# Patient Record
Sex: FEMALE | Race: WHITE | Employment: OTHER | URBAN - METROPOLITAN AREA
[De-identification: names, ages, dates, MRNs, and addresses within clinical notes are randomized per-mention and may not be internally consistent; named-entity substitution may affect disease eponyms.]

---

## 2019-05-23 ENCOUNTER — HOSPITAL ENCOUNTER (OUTPATIENT)
Dept: PREADMISSION TESTING | Age: 79
Discharge: HOME | End: 2019-05-23
Payer: MEDICARE

## 2019-05-23 VITALS
HEIGHT: 70 IN | SYSTOLIC BLOOD PRESSURE: 121 MMHG | HEART RATE: 69 BPM | BODY MASS INDEX: 18.67 KG/M2 | DIASTOLIC BLOOD PRESSURE: 74 MMHG | RESPIRATION RATE: 20 BRPM | WEIGHT: 130.38 LBS | TEMPERATURE: 98 F

## 2019-05-23 PROBLEM — M16.11 PRIMARY LOCALIZED OSTEOARTHRITIS OF RIGHT HIP: Status: ACTIVE | Noted: 2019-05-23

## 2019-05-23 LAB
ABO + RH BLD: NORMAL
ANION GAP SERPL CALC-SCNC: 3 MMOL/L (ref 5–15)
APPEARANCE UR: CLEAR
ATRIAL RATE: 65 BPM
BACTERIA URNS QL MICRO: NEGATIVE /HPF
BILIRUB UR QL: NEGATIVE
BLOOD GROUP ANTIBODIES SERPL: NORMAL
BUN SERPL-MCNC: 21 MG/DL (ref 6–20)
BUN/CREAT SERPL: 33 (ref 12–20)
CALCIUM SERPL-MCNC: 8.5 MG/DL (ref 8.5–10.1)
CALCULATED P AXIS, ECG09: 62 DEGREES
CALCULATED R AXIS, ECG10: 33 DEGREES
CALCULATED T AXIS, ECG11: 38 DEGREES
CHLORIDE SERPL-SCNC: 108 MMOL/L (ref 97–108)
CO2 SERPL-SCNC: 29 MMOL/L (ref 21–32)
COLOR UR: NORMAL
CREAT SERPL-MCNC: 0.63 MG/DL (ref 0.55–1.02)
DIAGNOSIS, 93000: NORMAL
EPITH CASTS URNS QL MICRO: NORMAL /LPF
ERYTHROCYTE [DISTWIDTH] IN BLOOD BY AUTOMATED COUNT: 13.6 % (ref 11.5–14.5)
EST. AVERAGE GLUCOSE BLD GHB EST-MCNC: 111 MG/DL
GLUCOSE SERPL-MCNC: 88 MG/DL (ref 65–100)
GLUCOSE UR STRIP.AUTO-MCNC: NEGATIVE MG/DL
HBA1C MFR BLD: 5.5 % (ref 4.2–6.3)
HCT VFR BLD AUTO: 42.6 % (ref 35–47)
HGB BLD-MCNC: 12.9 G/DL (ref 11.5–16)
HGB UR QL STRIP: NEGATIVE
HYALINE CASTS URNS QL MICRO: NORMAL /LPF (ref 0–5)
INR PPP: 1 (ref 0.9–1.1)
KETONES UR QL STRIP.AUTO: NEGATIVE MG/DL
LEUKOCYTE ESTERASE UR QL STRIP.AUTO: NEGATIVE
MCH RBC QN AUTO: 29.3 PG (ref 26–34)
MCHC RBC AUTO-ENTMCNC: 30.3 G/DL (ref 30–36.5)
MCV RBC AUTO: 96.8 FL (ref 80–99)
NITRITE UR QL STRIP.AUTO: NEGATIVE
NRBC # BLD: 0 K/UL (ref 0–0.01)
NRBC BLD-RTO: 0 PER 100 WBC
P-R INTERVAL, ECG05: 184 MS
PH UR STRIP: 5.5 [PH] (ref 5–8)
PLATELET # BLD AUTO: 216 K/UL (ref 150–400)
PMV BLD AUTO: 11.1 FL (ref 8.9–12.9)
POTASSIUM SERPL-SCNC: 4.1 MMOL/L (ref 3.5–5.1)
PROT UR STRIP-MCNC: NEGATIVE MG/DL
PROTHROMBIN TIME: 10.3 SEC (ref 9–11.1)
Q-T INTERVAL, ECG07: 412 MS
QRS DURATION, ECG06: 80 MS
QTC CALCULATION (BEZET), ECG08: 428 MS
RBC # BLD AUTO: 4.4 M/UL (ref 3.8–5.2)
RBC #/AREA URNS HPF: NORMAL /HPF (ref 0–5)
SODIUM SERPL-SCNC: 140 MMOL/L (ref 136–145)
SP GR UR REFRACTOMETRY: 1.02 (ref 1–1.03)
SPECIMEN EXP DATE BLD: NORMAL
UA: UC IF INDICATED,UAUC: NORMAL
UROBILINOGEN UR QL STRIP.AUTO: 0.2 EU/DL (ref 0.2–1)
VENTRICULAR RATE, ECG03: 65 BPM
WBC # BLD AUTO: 8.3 K/UL (ref 3.6–11)
WBC URNS QL MICRO: NORMAL /HPF (ref 0–4)

## 2019-05-23 PROCEDURE — 86900 BLOOD TYPING SEROLOGIC ABO: CPT

## 2019-05-23 PROCEDURE — 85027 COMPLETE CBC AUTOMATED: CPT

## 2019-05-23 PROCEDURE — 80048 BASIC METABOLIC PNL TOTAL CA: CPT

## 2019-05-23 PROCEDURE — 81001 URINALYSIS AUTO W/SCOPE: CPT

## 2019-05-23 PROCEDURE — 93005 ELECTROCARDIOGRAM TRACING: CPT

## 2019-05-23 PROCEDURE — 83036 HEMOGLOBIN GLYCOSYLATED A1C: CPT

## 2019-05-23 PROCEDURE — 85610 PROTHROMBIN TIME: CPT

## 2019-05-23 RX ORDER — RANITIDINE HCL 75 MG
TABLET ORAL AS NEEDED
COMMUNITY

## 2019-05-23 RX ORDER — MELATONIN
1000 DAILY
COMMUNITY

## 2019-05-23 NOTE — H&P
Chief Complaint: Follow-up of the Right Hip     Robert Temple comes into the clinic today for follow-up of her right hip. She does have significant arthritis in the right knee, but today she was more concerned about her right hip pain. She complains of a gradual onset of right hip pain that has worsen since her last office visit. She was previously noted to have moderate arthritis about the right hip. She tried intra-articular steroid injection for the right hip which only provided her only a week's worth of symptomatic relief. The patient was sent to obtain an Mri to rule out any acute pathology that would require surgical intervention. She comes in today to review her results. No recent injuries were noted. No systemic symptoms of infection. No radicular symptoms. Review of Systems   5/13/2019     Constitutional: Unexplained: Negative  Genitourinary: Frequent Urination: Negative  HEENT: Vision Loss: Positive  Neurological: Memory Loss: Negative  Integumentary: Rash: Negative  Cardiovascular: Palpatations: Negative  Hematologic: Bruises/Bleeds Easily: Positive  Gastrointestinal: Constipation: Negative  Immunological: Seasonal Allergies: Positive  Musculoskeletal: Joint Pain: Positive     Medical History        Past Medical History:   Diagnosis Date    GERD (gastroesophageal reflux disease)      Osteoarthritis              Surgical History         Past Surgical History:   Procedure Laterality Date    NO RELEVANT ORTHOPAEDIC SURGERIES        NO RELEVANT SURGERIES                Objective:          Vitals:     05/13/19 0926   BP: 130/74         Constitutional:  No acute distress. Well nourished. Well developed. Eyes:  Sclera are nonicteric. Respiratory:  No labored breathing. Cardiovascular:  No marked edema. Skin:  No marked skin ulcers. Neurological:  No marked sensory loss noted. Psychiatric: Alert and oriented x3.   Musculoskeletal      On exam of the right hip reveals she ambulates slowly favoring the right hip. Pain is more anteriorly. Any attempts to rotate the right hip elicits pain. No effusion. No sign of infection. No ecchymosis or erythema. No cellulitis or rash. I detect no obvious motor or sensory deficits in the lower extremities. The neurovascular status is intact.      On exam of the right knee when ambulating elicits mild to moderate right knee pain.         Imaging/Studies:    Order: XR HIP 2+ VW RIGHT - Indication: Right hip pain        X-ray Hip Right 2-3 Views W/pelvis When Performed ([id number])     Result Date: 5/13/2019  AP Pelvis, Frog Lateral.      Impression: Impression: I ordered and interpreted X-rays right hip. They revealed increased joint space narrowing and sclerosis of the femoral right hip. No metastatic disease.      I personally reviewed and interpreted the Mri of the right hip taken on 05/10/19.      Impression:    1. Bone marrow edema within the right femoral head with linear subchondral hypointensity in the superolateral femoral head, which may be related to osteonecrosis versus a subchondral insufficiency fracture.    2. There is a second, somewhat bandlike region of bone marrow edema within the medial intertrochanteric right proximal femur, which is indeterminant. No definite fracture line is identified, and these changes may be reactive, but cannot entirely exclude the possibility of an incomplete fracture  3. Moderate to severe right hip chondrosis with a right hip joint effusion and likely synovitis.    4. Severe lower lumbar degenerative disc disease.    5. Mild tendinopathy of the abductor tendons with mild left greater trochanteric bursitis.       Assessment:   There is no problem list on file for this patient.            ICD-10-CM   1. Avascular necrosis of femoral head, right M87.051   2. Primary osteoarthritis of right hip M16.11   3. Right hip pain M25.551            Plan:   I personally reviewed my findings with the patient.  Today's x-ray show increasing degenerative changes to the hip. The Mri revealed the probability of avascular necrosis or perhaps a stress fracture. I reviewed the pathophysiology of avascular necrosis.      We discussed treatment options. I explained to her that there is a possibility of a stress fracture, but hoped that her symptoms would improve with time. Due to the increase in degenerative changes and its association with AVN, I explained that the only treatment modality that would provide her long-term symptomatic relief would be total hip replacement. She understood this, but at this time she declined this option.      I reviewed the hospitalization, post-op and rehabilitation for total hip replacement. We discussed all complications associated with the surgery, including the chance of infection and DVT. She understands that she would be on antibiotics and anti-coagulants following surgery to prevent infection and DVT. I discussed issues of leg length and instability. I discussed the chance of dislocation following total hip replacement and instructed her on total hip precautions      PROCEDURE: Right total hip replacement. Date of Surgery Update:  Pamela Yost was seen and examined. Past Medical History:   Diagnosis Date    Arthritis     Diverticulosis     Eczema     Environmental allergies     GERD (gastroesophageal reflux disease)     Ill-defined condition     glaucoma    Rosacea     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 Taking? ACETAMINOPHEN EXTRA STRENGTH PO   Yes No   Sig: Take 1,000 mg by mouth three (3) times daily as needed. CALCIUM CARBONATE (TUMS PO)   Yes No   Sig: Take  by mouth as needed. 750mg tab. 1/2 tab twice daily as needed. FEXOFENADINE HCL (ALLEGRA PO)   Yes No   Sig: Take 180 mg by mouth daily.    IPRATROPIUM BROMIDE (ATROVENT NA)   Yes No   Si Sprays by Both Nostrils route as needed. OTHER   Yes No   Sig: Every 3 wk allergy iinjections   aspirin (ASPIRIN) 325 mg tablet   Yes No   Sig: Take 325 mg by mouth daily. coated   cholecalciferol (VITAMIN D3) 1,000 unit tablet   Yes No   Sig: Take 1,000 Units by mouth daily. denosumab (PROLIA) 60 mg/mL injection   Yes No   Si mg by SubCUTAneous route every 6 months. Indications: LAST DOSE MAY 2019   latanoprost (XALATAN) 0.005 % ophthalmic solution   Yes No   Sig: Administer 1 Drop to both eyes nightly. metroNIDAZOLE (METROGEL) 1 % topical gel   Yes No   Sig: Apply  to affected area daily. Use a thin layer to affected areas after washing    raNITIdine (ZANTAC 75) 75 mg tab   Yes No   Sig: Take  by mouth as needed. Facility-Administered Medications: None      Allergy to:Celecoxib; Sulfa (sulfonamide antibiotics); and Darvocet a500 [propoxyphene n-acetaminophen]  Physical Examination: General appearance - alert, well appearing, and in no distress  History and physical has been reviewed. The patient has been examined.  There have been no significant clinical changes since the completion of the originally dated History and Physical.    Signed By: Amber Sofia PA-C     May 29, 2019 9:40 AM

## 2019-05-23 NOTE — PERIOP NOTES
Preoperative instructions reviewed with patient. Patient given six pack of CHG liquid. Instructions reviewed on use of CHG liquid. Patient given SSI infection FAQ sheet. MRSA/MSSA treatment instruction sheet given with an explanation to patient that they will be notified if treatment instructions need to be initiated. Patient was given opportunity to ask questions on the information provided.

## 2019-05-24 LAB
BACTERIA SPEC CULT: NORMAL
BACTERIA SPEC CULT: NORMAL
SERVICE CMNT-IMP: NORMAL

## 2019-05-28 ENCOUNTER — ANESTHESIA EVENT (OUTPATIENT)
Dept: SURGERY | Age: 79
DRG: 470 | End: 2019-05-28
Payer: MEDICARE

## 2019-05-29 ENCOUNTER — APPOINTMENT (OUTPATIENT)
Dept: GENERAL RADIOLOGY | Age: 79
DRG: 470 | End: 2019-05-29
Payer: MEDICARE

## 2019-05-29 ENCOUNTER — ANESTHESIA (OUTPATIENT)
Dept: SURGERY | Age: 79
DRG: 470 | End: 2019-05-29
Payer: MEDICARE

## 2019-05-29 ENCOUNTER — APPOINTMENT (OUTPATIENT)
Dept: GENERAL RADIOLOGY | Age: 79
DRG: 470 | End: 2019-05-29
Attending: ORTHOPAEDIC SURGERY
Payer: MEDICARE

## 2019-05-29 ENCOUNTER — HOSPITAL ENCOUNTER (INPATIENT)
Age: 79
LOS: 1 days | Discharge: HOME HEALTH | DRG: 470 | End: 2019-05-30
Attending: ORTHOPAEDIC SURGERY | Admitting: ORTHOPAEDIC SURGERY
Payer: MEDICARE

## 2019-05-29 DIAGNOSIS — M16.11 PRIMARY LOCALIZED OSTEOARTHRITIS OF RIGHT HIP: Primary | ICD-10-CM

## 2019-05-29 LAB
GLUCOSE BLD STRIP.AUTO-MCNC: 88 MG/DL (ref 65–100)
SERVICE CMNT-IMP: NORMAL

## 2019-05-29 PROCEDURE — 76010000172 HC OR TIME 2.5 TO 3 HR INTENSV-TIER 1: Performed by: ORTHOPAEDIC SURGERY

## 2019-05-29 PROCEDURE — 74011250636 HC RX REV CODE- 250/636

## 2019-05-29 PROCEDURE — 73501 X-RAY EXAM HIP UNI 1 VIEW: CPT

## 2019-05-29 PROCEDURE — 77030031139 HC SUT VCRL2 J&J -A: Performed by: ORTHOPAEDIC SURGERY

## 2019-05-29 PROCEDURE — 74011000250 HC RX REV CODE- 250: Performed by: PHYSICIAN ASSISTANT

## 2019-05-29 PROCEDURE — 77030011640 HC PAD GRND REM COVD -A: Performed by: ORTHOPAEDIC SURGERY

## 2019-05-29 PROCEDURE — 77030020782 HC GWN BAIR PAWS FLX 3M -B

## 2019-05-29 PROCEDURE — 65270000029 HC RM PRIVATE

## 2019-05-29 PROCEDURE — 74011250637 HC RX REV CODE- 250/637: Performed by: PHYSICIAN ASSISTANT

## 2019-05-29 PROCEDURE — 74011250637 HC RX REV CODE- 250/637: Performed by: ANESTHESIOLOGY

## 2019-05-29 PROCEDURE — 74011000250 HC RX REV CODE- 250

## 2019-05-29 PROCEDURE — 77030027138 HC INCENT SPIROMETER -A

## 2019-05-29 PROCEDURE — 76060000036 HC ANESTHESIA 2.5 TO 3 HR: Performed by: ORTHOPAEDIC SURGERY

## 2019-05-29 PROCEDURE — 74011250636 HC RX REV CODE- 250/636: Performed by: PHYSICIAN ASSISTANT

## 2019-05-29 PROCEDURE — 0SR904A REPLACEMENT OF RIGHT HIP JOINT WITH CERAMIC ON POLYETHYLENE SYNTHETIC SUBSTITUTE, UNCEMENTED, OPEN APPROACH: ICD-10-PCS | Performed by: ORTHOPAEDIC SURGERY

## 2019-05-29 PROCEDURE — 74011250636 HC RX REV CODE- 250/636: Performed by: ANESTHESIOLOGY

## 2019-05-29 PROCEDURE — 77030012935 HC DRSG AQUACEL BMS -B: Performed by: ORTHOPAEDIC SURGERY

## 2019-05-29 PROCEDURE — 77030032490 HC SLV COMPR SCD KNE COVD -B: Performed by: ORTHOPAEDIC SURGERY

## 2019-05-29 PROCEDURE — 77030012893

## 2019-05-29 PROCEDURE — 77030018673: Performed by: ORTHOPAEDIC SURGERY

## 2019-05-29 PROCEDURE — 77030018836 HC SOL IRR NACL ICUM -A: Performed by: ORTHOPAEDIC SURGERY

## 2019-05-29 PROCEDURE — 77030035236 HC SUT PDS STRATFX BARB J&J -B: Performed by: ORTHOPAEDIC SURGERY

## 2019-05-29 PROCEDURE — 77030011264 HC ELECTRD BLD EXT COVD -A: Performed by: ORTHOPAEDIC SURGERY

## 2019-05-29 PROCEDURE — C1776 JOINT DEVICE (IMPLANTABLE): HCPCS | Performed by: ORTHOPAEDIC SURGERY

## 2019-05-29 PROCEDURE — 82962 GLUCOSE BLOOD TEST: CPT

## 2019-05-29 PROCEDURE — 74011000272 HC RX REV CODE- 272: Performed by: PHYSICIAN ASSISTANT

## 2019-05-29 PROCEDURE — 77030018846 HC SOL IRR STRL H20 ICUM -A: Performed by: ORTHOPAEDIC SURGERY

## 2019-05-29 PROCEDURE — 76210000017 HC OR PH I REC 1.5 TO 2 HR: Performed by: ORTHOPAEDIC SURGERY

## 2019-05-29 PROCEDURE — 77030006822 HC BLD SAW SAG BRSM -B: Performed by: ORTHOPAEDIC SURGERY

## 2019-05-29 PROCEDURE — 77030034850: Performed by: ORTHOPAEDIC SURGERY

## 2019-05-29 PROCEDURE — 74011000258 HC RX REV CODE- 258: Performed by: PHYSICIAN ASSISTANT

## 2019-05-29 PROCEDURE — 77030007866 HC KT SPN ANES BBMI -B: Performed by: ANESTHESIOLOGY

## 2019-05-29 PROCEDURE — 77030036660

## 2019-05-29 PROCEDURE — 77030008467 HC STPLR SKN COVD -B: Performed by: ORTHOPAEDIC SURGERY

## 2019-05-29 DEVICE — BIOLOX DELTA CERAMIC FEMORAL HEAD +8.5 36MM DIA 12/14 TAPER
Type: IMPLANTABLE DEVICE | Site: HIP | Status: FUNCTIONAL
Brand: BIOLOX DELTA

## 2019-05-29 DEVICE — PINNACLE GRIPTION ACETABULAR SHELL SECTOR 54MM OD
Type: IMPLANTABLE DEVICE | Site: HIP | Status: FUNCTIONAL
Brand: PINNACLE GRIPTION

## 2019-05-29 DEVICE — PINNACLE HIP SOLUTIONS ALTRX POLYETHYLENE ACETABULAR LINER NEUTRAL 36MM ID 54MM OD
Type: IMPLANTABLE DEVICE | Site: HIP | Status: FUNCTIONAL
Brand: PINNACLE ALTRX

## 2019-05-29 DEVICE — SUMMIT FEMORAL STEM 12/14 TAPER TAPER ED W/POROCOAT SIZE 7 HI 155MM
Type: IMPLANTABLE DEVICE | Site: HIP | Status: FUNCTIONAL
Brand: SUMMIT POROCOAT

## 2019-05-29 DEVICE — COMPONENT TOT HIP PRIMARY CERM ALTRX: Type: IMPLANTABLE DEVICE | Site: HIP | Status: FUNCTIONAL

## 2019-05-29 DEVICE — PINNACLE CANCELLOUS BONE SCREW 6.5MM X 15MM
Type: IMPLANTABLE DEVICE | Site: HIP | Status: FUNCTIONAL
Brand: PINNACLE

## 2019-05-29 RX ORDER — NALOXONE HYDROCHLORIDE 0.4 MG/ML
0.4 INJECTION, SOLUTION INTRAMUSCULAR; INTRAVENOUS; SUBCUTANEOUS AS NEEDED
Status: DISCONTINUED | OUTPATIENT
Start: 2019-05-29 | End: 2019-05-30 | Stop reason: HOSPADM

## 2019-05-29 RX ORDER — SODIUM CHLORIDE 0.9 % (FLUSH) 0.9 %
5-40 SYRINGE (ML) INJECTION EVERY 8 HOURS
Status: DISCONTINUED | OUTPATIENT
Start: 2019-05-29 | End: 2019-05-29 | Stop reason: HOSPADM

## 2019-05-29 RX ORDER — FAMOTIDINE 20 MG/1
20 TABLET, FILM COATED ORAL
Status: DISCONTINUED | OUTPATIENT
Start: 2019-05-29 | End: 2019-05-30 | Stop reason: HOSPADM

## 2019-05-29 RX ORDER — HYDROMORPHONE HYDROCHLORIDE 1 MG/ML
0.5 INJECTION, SOLUTION INTRAMUSCULAR; INTRAVENOUS; SUBCUTANEOUS
Status: ACTIVE | OUTPATIENT
Start: 2019-05-29 | End: 2019-05-30

## 2019-05-29 RX ORDER — SODIUM CHLORIDE 0.9 % (FLUSH) 0.9 %
5-40 SYRINGE (ML) INJECTION AS NEEDED
Status: DISCONTINUED | OUTPATIENT
Start: 2019-05-29 | End: 2019-05-29 | Stop reason: HOSPADM

## 2019-05-29 RX ORDER — LORATADINE 10 MG/1
10 TABLET ORAL DAILY
Status: DISCONTINUED | OUTPATIENT
Start: 2019-05-30 | End: 2019-05-30 | Stop reason: HOSPADM

## 2019-05-29 RX ORDER — HYDROXYZINE HYDROCHLORIDE 10 MG/1
10 TABLET, FILM COATED ORAL
Status: DISCONTINUED | OUTPATIENT
Start: 2019-05-29 | End: 2019-05-30 | Stop reason: HOSPADM

## 2019-05-29 RX ORDER — ROPIVACAINE HYDROCHLORIDE 5 MG/ML
30 INJECTION, SOLUTION EPIDURAL; INFILTRATION; PERINEURAL AS NEEDED
Status: DISCONTINUED | OUTPATIENT
Start: 2019-05-29 | End: 2019-05-29 | Stop reason: HOSPADM

## 2019-05-29 RX ORDER — SODIUM CHLORIDE 0.9 % (FLUSH) 0.9 %
5-40 SYRINGE (ML) INJECTION EVERY 8 HOURS
Status: DISCONTINUED | OUTPATIENT
Start: 2019-05-29 | End: 2019-05-30 | Stop reason: HOSPADM

## 2019-05-29 RX ORDER — SODIUM CHLORIDE 9 MG/ML
125 INJECTION, SOLUTION INTRAVENOUS CONTINUOUS
Status: DISPENSED | OUTPATIENT
Start: 2019-05-29 | End: 2019-05-30

## 2019-05-29 RX ORDER — CELECOXIB 200 MG/1
200 CAPSULE ORAL ONCE
Status: DISCONTINUED | OUTPATIENT
Start: 2019-05-29 | End: 2019-05-29 | Stop reason: HOSPADM

## 2019-05-29 RX ORDER — AMOXICILLIN 250 MG
1 CAPSULE ORAL 2 TIMES DAILY
Status: DISCONTINUED | OUTPATIENT
Start: 2019-05-29 | End: 2019-05-30 | Stop reason: HOSPADM

## 2019-05-29 RX ORDER — SODIUM CHLORIDE 9 MG/ML
25 INJECTION, SOLUTION INTRAVENOUS CONTINUOUS
Status: DISCONTINUED | OUTPATIENT
Start: 2019-05-29 | End: 2019-05-29 | Stop reason: HOSPADM

## 2019-05-29 RX ORDER — ACETAMINOPHEN 325 MG/1
650 TABLET ORAL EVERY 6 HOURS
Status: DISCONTINUED | OUTPATIENT
Start: 2019-05-29 | End: 2019-05-30 | Stop reason: HOSPADM

## 2019-05-29 RX ORDER — ACETAMINOPHEN 325 MG/1
650 TABLET ORAL
Status: DISCONTINUED | OUTPATIENT
Start: 2019-05-29 | End: 2019-05-30 | Stop reason: HOSPADM

## 2019-05-29 RX ORDER — SODIUM CHLORIDE, SODIUM LACTATE, POTASSIUM CHLORIDE, CALCIUM CHLORIDE 600; 310; 30; 20 MG/100ML; MG/100ML; MG/100ML; MG/100ML
100 INJECTION, SOLUTION INTRAVENOUS CONTINUOUS
Status: DISCONTINUED | OUTPATIENT
Start: 2019-05-29 | End: 2019-05-29 | Stop reason: HOSPADM

## 2019-05-29 RX ORDER — BUPIVACAINE HYDROCHLORIDE 7.5 MG/ML
INJECTION, SOLUTION EPIDURAL; RETROBULBAR AS NEEDED
Status: DISCONTINUED | OUTPATIENT
Start: 2019-05-29 | End: 2019-05-29

## 2019-05-29 RX ORDER — OXYCODONE HYDROCHLORIDE 5 MG/1
10 TABLET ORAL
Status: DISCONTINUED | OUTPATIENT
Start: 2019-05-29 | End: 2019-05-30 | Stop reason: HOSPADM

## 2019-05-29 RX ORDER — CELECOXIB 200 MG/1
200 CAPSULE ORAL 2 TIMES DAILY
Status: DISCONTINUED | OUTPATIENT
Start: 2019-05-29 | End: 2019-05-29

## 2019-05-29 RX ORDER — PROPOFOL 10 MG/ML
INJECTION, EMULSION INTRAVENOUS
Status: DISCONTINUED | OUTPATIENT
Start: 2019-05-29 | End: 2019-05-29 | Stop reason: HOSPADM

## 2019-05-29 RX ORDER — PROPOFOL 10 MG/ML
INJECTION, EMULSION INTRAVENOUS AS NEEDED
Status: DISCONTINUED | OUTPATIENT
Start: 2019-05-29 | End: 2019-05-29 | Stop reason: HOSPADM

## 2019-05-29 RX ORDER — CEFAZOLIN SODIUM/WATER 2 G/20 ML
2 SYRINGE (ML) INTRAVENOUS EVERY 8 HOURS
Status: DISCONTINUED | OUTPATIENT
Start: 2019-05-29 | End: 2019-05-29 | Stop reason: HOSPADM

## 2019-05-29 RX ORDER — OXYCODONE HYDROCHLORIDE 5 MG/1
5 TABLET ORAL
Status: DISCONTINUED | OUTPATIENT
Start: 2019-05-29 | End: 2019-05-30 | Stop reason: HOSPADM

## 2019-05-29 RX ORDER — MIDAZOLAM HYDROCHLORIDE 1 MG/ML
INJECTION, SOLUTION INTRAMUSCULAR; INTRAVENOUS AS NEEDED
Status: DISCONTINUED | OUTPATIENT
Start: 2019-05-29 | End: 2019-05-29 | Stop reason: HOSPADM

## 2019-05-29 RX ORDER — FENTANYL CITRATE 50 UG/ML
50 INJECTION, SOLUTION INTRAMUSCULAR; INTRAVENOUS AS NEEDED
Status: DISCONTINUED | OUTPATIENT
Start: 2019-05-29 | End: 2019-05-29 | Stop reason: HOSPADM

## 2019-05-29 RX ORDER — MORPHINE SULFATE 10 MG/ML
2 INJECTION, SOLUTION INTRAMUSCULAR; INTRAVENOUS
Status: DISCONTINUED | OUTPATIENT
Start: 2019-05-29 | End: 2019-05-29 | Stop reason: HOSPADM

## 2019-05-29 RX ORDER — MIDAZOLAM HYDROCHLORIDE 1 MG/ML
1 INJECTION, SOLUTION INTRAMUSCULAR; INTRAVENOUS AS NEEDED
Status: DISCONTINUED | OUTPATIENT
Start: 2019-05-29 | End: 2019-05-29 | Stop reason: HOSPADM

## 2019-05-29 RX ORDER — IPRATROPIUM BROMIDE 42 UG/1
2 SPRAY, METERED NASAL AS NEEDED
Status: DISCONTINUED | OUTPATIENT
Start: 2019-05-29 | End: 2019-05-30 | Stop reason: HOSPADM

## 2019-05-29 RX ORDER — MIDAZOLAM HYDROCHLORIDE 1 MG/ML
0.5 INJECTION, SOLUTION INTRAMUSCULAR; INTRAVENOUS
Status: DISCONTINUED | OUTPATIENT
Start: 2019-05-29 | End: 2019-05-29 | Stop reason: HOSPADM

## 2019-05-29 RX ORDER — LIDOCAINE HYDROCHLORIDE 10 MG/ML
0.1 INJECTION, SOLUTION EPIDURAL; INFILTRATION; INTRACAUDAL; PERINEURAL AS NEEDED
Status: DISCONTINUED | OUTPATIENT
Start: 2019-05-29 | End: 2019-05-29 | Stop reason: HOSPADM

## 2019-05-29 RX ORDER — ONDANSETRON 4 MG/1
4 TABLET, ORALLY DISINTEGRATING ORAL
Status: DISCONTINUED | OUTPATIENT
Start: 2019-05-29 | End: 2019-05-30 | Stop reason: HOSPADM

## 2019-05-29 RX ORDER — HYDROMORPHONE HYDROCHLORIDE 1 MG/ML
0.5 INJECTION, SOLUTION INTRAMUSCULAR; INTRAVENOUS; SUBCUTANEOUS
Status: DISCONTINUED | OUTPATIENT
Start: 2019-05-29 | End: 2019-05-29 | Stop reason: HOSPADM

## 2019-05-29 RX ORDER — POLYETHYLENE GLYCOL 3350 17 G/17G
17 POWDER, FOR SOLUTION ORAL DAILY
Status: DISCONTINUED | OUTPATIENT
Start: 2019-05-30 | End: 2019-05-30 | Stop reason: HOSPADM

## 2019-05-29 RX ORDER — FACIAL-BODY WIPES
10 EACH TOPICAL DAILY PRN
Status: DISCONTINUED | OUTPATIENT
Start: 2019-05-31 | End: 2019-05-30 | Stop reason: HOSPADM

## 2019-05-29 RX ORDER — DIPHENHYDRAMINE HYDROCHLORIDE 50 MG/ML
12.5 INJECTION, SOLUTION INTRAMUSCULAR; INTRAVENOUS AS NEEDED
Status: DISCONTINUED | OUTPATIENT
Start: 2019-05-29 | End: 2019-05-29 | Stop reason: HOSPADM

## 2019-05-29 RX ORDER — ACETAMINOPHEN 325 MG/1
650 TABLET ORAL ONCE
Status: COMPLETED | OUTPATIENT
Start: 2019-05-29 | End: 2019-05-29

## 2019-05-29 RX ORDER — SODIUM CHLORIDE 0.9 % (FLUSH) 0.9 %
5-40 SYRINGE (ML) INJECTION AS NEEDED
Status: DISCONTINUED | OUTPATIENT
Start: 2019-05-29 | End: 2019-05-30 | Stop reason: HOSPADM

## 2019-05-29 RX ORDER — BUPIVACAINE HYDROCHLORIDE 5 MG/ML
INJECTION, SOLUTION EPIDURAL; INTRACAUDAL AS NEEDED
Status: DISCONTINUED | OUTPATIENT
Start: 2019-05-29 | End: 2019-05-29 | Stop reason: HOSPADM

## 2019-05-29 RX ORDER — CEFAZOLIN SODIUM/WATER 2 G/20 ML
2 SYRINGE (ML) INTRAVENOUS EVERY 8 HOURS
Status: COMPLETED | OUTPATIENT
Start: 2019-05-29 | End: 2019-05-30

## 2019-05-29 RX ORDER — FENTANYL CITRATE 50 UG/ML
25 INJECTION, SOLUTION INTRAMUSCULAR; INTRAVENOUS
Status: DISCONTINUED | OUTPATIENT
Start: 2019-05-29 | End: 2019-05-29 | Stop reason: HOSPADM

## 2019-05-29 RX ORDER — ONDANSETRON 2 MG/ML
4 INJECTION INTRAMUSCULAR; INTRAVENOUS AS NEEDED
Status: DISCONTINUED | OUTPATIENT
Start: 2019-05-29 | End: 2019-05-29 | Stop reason: HOSPADM

## 2019-05-29 RX ADMIN — SENNOSIDES,DOCUSATE SODIUM 1 TABLET: 8.6; 5 TABLET, FILM COATED ORAL at 17:24

## 2019-05-29 RX ADMIN — PROPOFOL 25 MCG/KG/MIN: 10 INJECTION, EMULSION INTRAVENOUS at 11:30

## 2019-05-29 RX ADMIN — PROPOFOL 10 MG: 10 INJECTION, EMULSION INTRAVENOUS at 12:48

## 2019-05-29 RX ADMIN — Medication 2 G: at 11:45

## 2019-05-29 RX ADMIN — TRANEXAMIC ACID 1 G: 100 INJECTION, SOLUTION INTRAVENOUS at 12:02

## 2019-05-29 RX ADMIN — PROPOFOL 20 MG: 10 INJECTION, EMULSION INTRAVENOUS at 12:02

## 2019-05-29 RX ADMIN — OXYCODONE HYDROCHLORIDE 2.5 MG: 5 TABLET ORAL at 22:52

## 2019-05-29 RX ADMIN — SODIUM CHLORIDE, SODIUM LACTATE, POTASSIUM CHLORIDE, AND CALCIUM CHLORIDE 100 ML/HR: 600; 310; 30; 20 INJECTION, SOLUTION INTRAVENOUS at 09:59

## 2019-05-29 RX ADMIN — ACETAMINOPHEN 650 MG: 325 TABLET ORAL at 10:01

## 2019-05-29 RX ADMIN — ACETAMINOPHEN 650 MG: 325 TABLET ORAL at 16:23

## 2019-05-29 RX ADMIN — SODIUM CHLORIDE 125 ML/HR: 900 INJECTION, SOLUTION INTRAVENOUS at 22:51

## 2019-05-29 RX ADMIN — SODIUM CHLORIDE, SODIUM LACTATE, POTASSIUM CHLORIDE, AND CALCIUM CHLORIDE: 600; 310; 30; 20 INJECTION, SOLUTION INTRAVENOUS at 12:40

## 2019-05-29 RX ADMIN — BUPIVACAINE HYDROCHLORIDE 10 MG: 5 INJECTION, SOLUTION EPIDURAL; INTRACAUDAL at 11:42

## 2019-05-29 RX ADMIN — Medication 2 G: at 18:30

## 2019-05-29 RX ADMIN — PROPOFOL 20 MG: 10 INJECTION, EMULSION INTRAVENOUS at 11:43

## 2019-05-29 RX ADMIN — RIVAROXABAN 10 MG: 10 TABLET, FILM COATED ORAL at 21:17

## 2019-05-29 RX ADMIN — MIDAZOLAM HYDROCHLORIDE 2 MG: 1 INJECTION, SOLUTION INTRAMUSCULAR; INTRAVENOUS at 11:28

## 2019-05-29 RX ADMIN — SODIUM CHLORIDE 125 ML/HR: 900 INJECTION, SOLUTION INTRAVENOUS at 15:15

## 2019-05-29 RX ADMIN — ACETAMINOPHEN 650 MG: 325 TABLET ORAL at 21:17

## 2019-05-29 NOTE — PROGRESS NOTES
Primary Nurse Morenita Ortiz RN and [facility], RN performed a dual skin assessment on this patient No impairment noted  Frank score is 20

## 2019-05-29 NOTE — PROGRESS NOTES
Ortho Daily Progress Note    5/29/2019  4:45 PM    POD:  Day of Surgery  S/P:  Procedure(s):  RIGHT TOTAL HIP REPLACEMENT    Afebrile/VSS, NAD, A&O x3  Doing well without complaints of nausea  Pain well controlled  Calves soft/NTTP Bilaterally  Incision OK, no drainage or dehiscence. Dressing clean and dry  Not moving lower extremities much, spinal remains effective  Neurocirculatory exam intact and within normal range. Lab Results   Component Value Date/Time    HGB 12.9 05/23/2019 08:42 AM    INR 1.0 05/23/2019 08:42 AM     Recent Labs     05/23/19  0842   CREA 0.63   BUN 21*     Estimated Creatinine Clearance: 70.9 mL/min (based on SCr of 0.63 mg/dL).     PLAN:  DVT prophylaxis: Xarelto   WBAT with PT-mobilization  Pain Control- oxycodone, tylenol, ice  Plan to D/C home with HH/PT in 1-2 days      STEFAN Mehta

## 2019-05-29 NOTE — BRIEF OP NOTE
BRIEF OPERATIVE NOTE    Date of Procedure: 5/29/2019   Preoperative Diagnosis: DJD RIGHT HIP  Postoperative Diagnosis: DJD RIGHT HIP    Procedure(s):  RIGHT TOTAL HIP REPLACEMENT  Surgeon(s) and Role:     Carlos Oates MD - Primary         Surgical Assistant: Janeth Burrell PA-C    Surgical Staff:  Circ-1: Selena Johnson RN  Circ-Relief: Jesus Tubbs RN  Physician Assistant: Delisa Leon PA-C  Scrub Tech-1: Maria Del Carmen Gillespie  Scrub Tech-Relief: Saxena Gambles  Surg Asst-1: Ruddy Burgess  Event Time In Time Out   Incision Start 1220    Incision Close 1402      Anesthesia: Epidural   Estimated Blood Loss: 200 mL  Specimens: * No specimens in log *   Findings: DJD Right hip   Complications: None. Implants:   Implant Name Type Inv.  Item Serial No.  Lot No. LRB No. Used Action   CUP ACET SECTOR GRIPTION 54MM -- TI - SNA  CUP ACET SECTOR GRIPTION 54MM -- TI NA [facility] [employer] [id number] Right 1 Implanted   SCR ACET CANC PINN 6.5X15MM SS --  - SNA  SCR ACET CANC PINN 6.5X15MM SS --  NA [facility] [id number] Right 1 Implanted   LINER ACET PINN NEUT [id number] -- ALTRX - SNA  LINER ACET PINN NEUT [id number] -- ALTRX NA [facility] [id number] Right 1 Implanted   STEM FEM POR 12/14 TAPR 7 -- SUMMIT - SNA  STEM FEM POR 12/14 TAPR 7 -- SUMMIT NA [facility] [employer] [id number] Right 1 Implanted   HEAD FEM CER ARTC EZ 36M+8.5 -- BIOLOX DELTA - SNA  HEAD FEM CER ARTC EZ 36M+8.5 -- BIOLOX DELTA NA [facility] [employer] [id number] Right 1 Implanted

## 2019-05-29 NOTE — ANESTHESIA PROCEDURE NOTES
Spinal Block    Start time: 5/29/2019 11:35 AM  End time: 5/29/2019 11:43 AM  Performed by: Beth Nichole CRNA  Authorized by: Xander Haq MD     Pre-procedure: Indications: at surgeon's request and primary anesthetic  Preanesthetic Checklist: patient identified, risks and benefits discussed, anesthesia consent, site marked, patient being monitored and timeout performed    Timeout Time: 11:35          Spinal Block:   Patient Position:  Seated  Prep Region:  Lumbar  Prep: Betadine      Location:  L3-4  Technique:  Single shot    Local Dose (mL):  10    Needle:   Needle Type:  Pencan  Needle Gauge:  24 G  Attempts:  2      Events: CSF confirmed and no blood with aspiration        Assessment:  Insertion:  Uncomplicated  Patient tolerance:  Patient tolerated the procedure well with no immediate complications  First attempt by CRNA    Second by LISA.

## 2019-05-29 NOTE — PROGRESS NOTES
Patient assessed for readiness to ambulate. Vital Signs  Level of Consciousness: Alert  Temp: 97.5 °F (36.4 °C)  Temp Source: Oral  Pulse (Heart Rate): 77  Heart Rate Source: Monitor  Cardiac Rhythm: Normal sinus rhythm  Resp Rate: 16  BP: 127/74  MAP (Monitor): 68  MAP (Calculated): 92  BP 1 Location: Right arm  BP 1 Method: Automatic  BP Patient Position: At rest  MEWS Score: 1. Patient ambulated with assistance of 2 nurses. Patient ambulated with gait belt and walker. Patient walked to [place]. Patient returned safely to bed.     9168  Bedside shift change report given to Amy Ramírez RN (oncoming nurse) by Yoni Ling RN (offgoing nurse). Report included the following information SBAR, Kardex and Recent Results.

## 2019-05-29 NOTE — ANESTHESIA PREPROCEDURE EVALUATION
Relevant Problems   No relevant active problems       Anesthetic History   No history of anesthetic complications            Review of Systems / Medical History  Patient summary reviewed, nursing notes reviewed and pertinent labs reviewed    Pulmonary  Within defined limits                 Neuro/Psych   Within defined limits    CVA       Cardiovascular  Within defined limits                Exercise tolerance: >4 METS     GI/Hepatic/Renal  Within defined limits   GERD           Endo/Other  Within defined limits      Arthritis     Other Findings              Physical Exam    Airway  Mallampati: II  TM Distance: 4 - 6 cm  Neck ROM: normal range of motion   Mouth opening: Normal     Cardiovascular  Regular rate and rhythm,  S1 and S2 normal,  no murmur, click, rub, or gallop             Dental  No notable dental hx       Pulmonary  Breath sounds clear to auscultation               Abdominal  GI exam deferred       Other Findings            Anesthetic Plan    ASA: 2  Anesthesia type: spinal    Monitoring Plan: BIS      Induction: Intravenous  Anesthetic plan and risks discussed with: Patient

## 2019-05-29 NOTE — PERIOP NOTES
TRANSFER - OUT REPORT:    Verbal report given to Keara(name) on Juan Seal  being transferred to 569(unit) for routine post - op       Report consisted of patients Situation, Background, Assessment and   Recommendations(SBAR). Time Pre op antibiotic given:1145  Anesthesia Stop time: 0230    Information from the following report(s) SBAR, OR Summary, Intake/Output, MAR and Cardiac Rhythm NSR. was reviewed with the receiving nurse. Opportunity for questions and clarification was provided. Is the patient on 02? NO    Is the patient on a monitor? NO    Is the nurse transporting with the patient? NO    Surgical Waiting Area notified of patient's transfer from PACU? YES      The following personal items collected during your admission accompanied patient upon transfer:   Dental Appliance: Dental Appliances:  Other (comment)(crowns molars)  Vision:    Hearing Aid:    Jewelry:    Clothing: Clothing: With patient  Other Valuables:    Valuables sent to safe:

## 2019-05-29 NOTE — PROGRESS NOTES
Physical Therapy  5/29/2019    1612:  Orders received, acknowledged, and appreciated. Chart reviewed. Patient unable to discriminate light touch or deep pressure sensations and unable to perform active/active-assisted ROM in B LEs at this time. Will continue to follow. 0426:  5570:  Patient able to minimally dorsiflex and plantarflex B ankles; unable to perform gluteal sets; unable to perform SLRs; sensation remains impaired bilaterally. Will continue to follow. Nursing, please assist with mobilization this evening as appropriate. Thank you.    Cynthia Simeon, PT, DPT

## 2019-05-29 NOTE — ANESTHESIA POSTPROCEDURE EVALUATION
Procedure(s):  RIGHT TOTAL HIP REPLACEMENT.    spinal    Anesthesia Post Evaluation        Patient location during evaluation: PACU  Patient participation: complete - patient participated  Level of consciousness: awake and alert  Pain management: adequate  Airway patency: patent  Anesthetic complications: no  Cardiovascular status: acceptable  Respiratory status: acceptable  Hydration status: acceptable  Comments: I have seen and evaluated the patient and is ready for discharge. Italia Carnes MD    Post anesthesia nausea and vomiting:  none      Vitals Value Taken Time   /54 5/29/2019  3:15 PM   Temp 36.4 °C (97.5 °F) 5/29/2019  3:15 PM   Pulse 66 5/29/2019  3:21 PM   Resp 22 5/29/2019  3:21 PM   SpO2 99 % 5/29/2019  3:21 PM   Vitals shown include unvalidated device data.

## 2019-05-30 VITALS
TEMPERATURE: 98.4 F | SYSTOLIC BLOOD PRESSURE: 115 MMHG | HEIGHT: 70 IN | DIASTOLIC BLOOD PRESSURE: 70 MMHG | RESPIRATION RATE: 18 BRPM | OXYGEN SATURATION: 100 % | HEART RATE: 80 BPM | BODY MASS INDEX: 18.67 KG/M2 | WEIGHT: 130.38 LBS

## 2019-05-30 LAB
ANION GAP SERPL CALC-SCNC: 8 MMOL/L (ref 5–15)
BUN SERPL-MCNC: 11 MG/DL (ref 6–20)
BUN/CREAT SERPL: 13 (ref 12–20)
CALCIUM SERPL-MCNC: 7.9 MG/DL (ref 8.5–10.1)
CHLORIDE SERPL-SCNC: 110 MMOL/L (ref 97–108)
CO2 SERPL-SCNC: 24 MMOL/L (ref 21–32)
CREAT SERPL-MCNC: 0.82 MG/DL (ref 0.55–1.02)
GLUCOSE SERPL-MCNC: 115 MG/DL (ref 65–100)
HGB BLD-MCNC: 10.7 G/DL (ref 11.5–16)
INR PPP: 1.4 (ref 0.9–1.1)
POTASSIUM SERPL-SCNC: 3.9 MMOL/L (ref 3.5–5.1)
PROTHROMBIN TIME: 13.5 SEC (ref 9–11.1)
SODIUM SERPL-SCNC: 142 MMOL/L (ref 136–145)

## 2019-05-30 PROCEDURE — 97161 PT EVAL LOW COMPLEX 20 MIN: CPT

## 2019-05-30 PROCEDURE — 36415 COLL VENOUS BLD VENIPUNCTURE: CPT

## 2019-05-30 PROCEDURE — 97116 GAIT TRAINING THERAPY: CPT

## 2019-05-30 PROCEDURE — 97110 THERAPEUTIC EXERCISES: CPT

## 2019-05-30 PROCEDURE — 80048 BASIC METABOLIC PNL TOTAL CA: CPT

## 2019-05-30 PROCEDURE — 97165 OT EVAL LOW COMPLEX 30 MIN: CPT

## 2019-05-30 PROCEDURE — 74011250636 HC RX REV CODE- 250/636: Performed by: PHYSICIAN ASSISTANT

## 2019-05-30 PROCEDURE — 85018 HEMOGLOBIN: CPT

## 2019-05-30 PROCEDURE — 97535 SELF CARE MNGMENT TRAINING: CPT

## 2019-05-30 PROCEDURE — 85610 PROTHROMBIN TIME: CPT

## 2019-05-30 PROCEDURE — 74011250637 HC RX REV CODE- 250/637: Performed by: PHYSICIAN ASSISTANT

## 2019-05-30 RX ORDER — OXYCODONE HYDROCHLORIDE 5 MG/1
2.5-5 TABLET ORAL
Qty: 40 TAB | Refills: 0 | Status: SHIPPED | OUTPATIENT
Start: 2019-05-30 | End: 2019-06-06

## 2019-05-30 RX ADMIN — POLYETHYLENE GLYCOL 3350 17 G: 17 POWDER, FOR SOLUTION ORAL at 09:38

## 2019-05-30 RX ADMIN — SODIUM CHLORIDE 125 ML/HR: 900 INJECTION, SOLUTION INTRAVENOUS at 09:46

## 2019-05-30 RX ADMIN — OXYCODONE HYDROCHLORIDE 2.5 MG: 5 TABLET ORAL at 09:37

## 2019-05-30 RX ADMIN — Medication 2 G: at 03:24

## 2019-05-30 RX ADMIN — Medication 10 ML: at 14:00

## 2019-05-30 RX ADMIN — ACETAMINOPHEN 650 MG: 325 TABLET ORAL at 09:37

## 2019-05-30 RX ADMIN — ACETAMINOPHEN 650 MG: 325 TABLET ORAL at 15:29

## 2019-05-30 RX ADMIN — ACETAMINOPHEN 650 MG: 325 TABLET ORAL at 03:25

## 2019-05-30 RX ADMIN — SENNOSIDES,DOCUSATE SODIUM 1 TABLET: 8.6; 5 TABLET, FILM COATED ORAL at 09:37

## 2019-05-30 RX ADMIN — RIVAROXABAN 10 MG: 10 TABLET, FILM COATED ORAL at 12:31

## 2019-05-30 RX ADMIN — OXYCODONE HYDROCHLORIDE 2.5 MG: 5 TABLET ORAL at 05:56

## 2019-05-30 NOTE — PROGRESS NOTES
Problem: Mobility Impaired (Adult and Pediatric)  Goal: *Acute Goals and Plan of Care (Insert Text)  Description  Physical Therapy Goals  Initiated 5/30/2019    1. Patient will move from supine to sit and sit to supine , scoot up and down and roll side to side in bed with modified independence within 4 days. 2. Patient will perform sit to stand with modified independence within 4 days. 3. Patient will ambulate with modified independence for 150 feet with the least restrictive device within 4 days. 4. Patient will ascend/descend 4 stairs with cane and one handrail with modified independence within 4 days. 5. Patient will verbalize and demonstrate understanding of posterior hip positioning precautions per protocol within 4 days. 6. Patient will perform post-ELMA home exercise program per protocol with independence within 4 days. 5/30/2019 1455 by Charmayne Holler  Outcome: Resolved/Met   PHYSICAL THERAPY TREATMENT/DISCHARGE  Patient: Edouard Doyle (79 y.o. female)  Date: 5/30/2019  Diagnosis: Osteoarthritis of right hip [M16.11] Primary localized osteoarthritis of right hip  Procedure(s) (LRB):  RIGHT TOTAL HIP REPLACEMENT (Right) 1 Day Post-Op  Precautions: Fall, PWB, Total hip((50% on RLE))  Chart, physical therapy assessment, plan of care and goals were reviewed. ASSESSMENT:  Patient presents going to toilet with Rn. Performing bed mobility and transfers with modified independence. Ambulated 150 ft with Rw, gait belt and contact guard. Initially performed step-to gait, then after cueing, step-through gait and more continuous steps. Negotiated 4 steps (up and down) safely with cane and one rail. Patient demonstrates understanding of posterior hip precautions, 3/3. Patient attended pre-op JOINT CLASS, is independent with post-op ELMA exercise protocol and has same in written, illlustrated form. Patient is cleared for discharge from PT standpoint.  She will benefit from [address] [place] [address] [facility] in order to achieve maximum level of safe, functional mobility, balance and return to independent PLOF. Progression toward goals:  ?      Improving appropriately and progressing toward goals  ? Improving slowly and progressing toward goals  ? Not making progress toward goals and plan of care will be adjusted     PLAN:  Patient will be discharged from acute skilled physical therapy at this time. Rationale for discharge:  ? Goals Achieved  ? Plateau Reached  ? Patient not participating in therapy  ? Other:  Discharge Recommendations:  Home Health Further Equipment Recommendations for Discharge:  Rolling walker ordered, delivered and adjusted for patient. SUBJECTIVE:   Patient stated ? I am eager to go home? OBJECTIVE DATA SUMMARY:   Critical Behavior:  Neurologic State: Alert  Orientation Level: Oriented X4  Cognition: Appropriate decision making, Appropriate for age attention/concentration, Appropriate safety awareness, Follows commands     Functional Mobility Training:  Bed Mobility:  Rolling: Contact guard assistance  Supine to Sit: Contact guard assistance  Sit to Supine: Contact guard assistance  Scooting: Contact guard assistance        Transfers:  Sit to Stand: Contact guard assistance  Stand to Sit: Contact guard assistance        Bed to Chair: Contact guard assistance                    Balance:  Sitting: Intact  Standing: Intact; With support  Standing - Static: Good;Constant support  Standing - Dynamic : Good;Constant support  Ambulation/Gait Training:  Distance (ft): 150 Feet (ft)  Assistive Device: Walker, rolling;Gait belt  Ambulation - Level of Assistance: Contact guard assistance        Gait Abnormalities: Antalgic(initally step-to, then progressed to step-through)  Right Side Weight Bearing: Partial (%)(50%)     Base of Support: Narrowed  Stance: Right decreased  Speed/Polina: Slow  Step Length: Right shortened;Left shortened  Swing Pattern: Right asymmetrical    Stairs:  Number of Stairs Trained: 4  Stairs - Level of Assistance: Contact guard assistance   Rail Use: Left (left rail and cane up, right rail and cane down)  Functional Measure:  Barthel Index:    Bathin  Bladder: 10  Bowels: 10  Groomin  Dressin  Feeding: 10  Mobility: 15  Stairs: 10  Toilet Use: 10  Transfer (Bed to Chair and Back): 15  Total: 90/100       Percentage of impairment   0%   1-19%   20-39%   40-59%   60-79%   80-99%   100%   Barthel Score 0-100 100 99-80 79-60 59-40 20-39 1-19   0     The Barthel ADL Index: Guidelines  1. The index should be used as a record of what a patient does, not as a record of what a patient could do. 2. The main aim is to establish degree of independence from any help, physical or verbal, however minor and for whatever reason. 3. The need for supervision renders the patient not independent. 4. A patient's performance should be established using the best available evidence. Asking the patient, friends/relatives and nurses are the usual sources, but direct observation and common sense are also important. However direct testing is not needed. 5. Usually the patient's performance over the preceding 24-48 hours is important, but occasionally longer periods will be relevant. 6. Middle categories imply that the patient supplies over 50 per cent of the effort. 7. Use of aids to be independent is allowed. Marylu Dangelo., Barthel, D.W. (5688). Functional evaluation: the Barthel Index. [address] [facility] (14)2. RL Lee, Neal Simmons., Bebe Boyle, [place], [address] [place] [address] (). Measuring the change indisability after inpatient rehabilitation; comparison of the responsiveness of the Barthel Index and Functional George Measure. Journal of Neurology, Neurosurgery, and Psychiatry, 66(4), 009-164. Zheng Coronel, [place].SANDY, Shira Celestin  WMakJ.M, & Asya Darnell MMakA. (2004.) Assessment of post-stroke quality of life in cost-effectiveness studies:  The usefulness of the Barthel Index and the EuroQoL-5D. Quality of Life Research, 15, 035-92        Therapeutic Exercises:   Patient attended pre-op JOINT CLASS, is independent with post-op ELMA exercise protocol and has same in written, illlustrated form. Activity Tolerance:   Good    After treatment:   ? Patient left in no apparent distress sitting up in chair  ? Patient left in no apparent distress in bed  ? Call bell left within reach  ? Nursing notified  ? Caregiver present  ?  Bed alarm activated    COMMUNICATION/COLLABORATION:   The patient?s plan of care was discussed with: Occupational Therapist, Registered Nurse and     Minda Brown   Time Calculation: 40 mins

## 2019-05-30 NOTE — PROGRESS NOTES
Problem: Falls - Risk of  Goal: *Absence of Falls  Description  Document Trixie Rai Fall Risk and appropriate interventions in the flowsheet.   Outcome: Progressing Towards Goal  Note:   Fall Risk Interventions:  Mobility Interventions: Communicate number of staff needed for ambulation/transfer, Patient to call before getting OOB, PT Consult for mobility concerns         Medication Interventions: Evaluate medications/consider consulting pharmacy, Patient to call before getting OOB, Teach patient to arise slowly    Elimination Interventions: Call light in reach, Elevated toilet seat, Patient to call for help with toileting needs, Stay With Me (per policy), Toilet paper/wipes in reach, Toileting schedule/hourly rounds              Problem: Hip Replacement: Post Op Day 1  Goal: Activity/Safety  Outcome: Progressing Towards Goal  Note:   Assist patient in ambulation with walker  Patient to call for assistance  Call bell within reach  Patient to work with PT  Goal: *Optimal pain control at patient's stated goal  Outcome: Progressing Towards Goal  Note:   Assess pain level and characteristics using numeric pain scale   Administer pain medication as ordered/needed  Reassess pain level and characteristics using numeric pain scale 1 hour after pain med administration  Goal: *Discharge plan identified  Outcome: Progressing Towards Goal  Note:   Patient to be discharged to home with home health

## 2019-05-30 NOTE — PROGRESS NOTES
Problem: Mobility Impaired (Adult and Pediatric)  Goal: *Acute Goals and Plan of Care (Insert Text)  Description  Physical Therapy Goals  Initiated 5/30/2019    1. Patient will move from supine to sit and sit to supine , scoot up and down and roll side to side in bed with modified independence within 4 days. 2. Patient will perform sit to stand with modified independence within 4 days. 3. Patient will ambulate with modified independence for 150 feet with the least restrictive device within 4 days. 4. Patient will ascend/descend 4 stairs with cane and one handrail with modified independence within 4 days. 5. Patient will verbalize and demonstrate understanding of posterior hip positioning precautions per protocol within 4 days. 6. Patient will perform post-ELMA home exercise program per protocol with independence within 4 days. Outcome: Progressing Towards Goal   PHYSICAL THERAPY EVALUATION  Patient: Sherwin Cruz (53 y.o. female)  Date: 5/30/2019  Primary Diagnosis: Osteoarthritis of right hip [M16.11]  Procedure(s) (LRB):  RIGHT TOTAL HIP REPLACEMENT (Right) 1 Day Post-Op   Precautions:   Fall, PWB, Total hip((50% on RLE))    ASSESSMENT :  Based on the objective data described below, the patient presents with  impairment in functional mobility, activity tolerance and balance s/p R ELMA, posterio-lateral approach. PLOF: Independent with ADLs and IADLs. Lives alone in one story home, 3 steps with rail to enter. Patient has an elderly sister but has hired a personal care aide from [facility] to drive her home and stay with her for as long as needed. Reviewed 3/3 hip precautions with patient, and demonstrated same. Patient verbalized understanding. Performed post ELMA exercises as follows: ankle pumps/quad sets/glut sets/assisted heel slides/assisted hip abd-add, all performed x 10 reps. Patient performed bed mobility with contact guard assistance, using LLE to scoop and move RLE.  Transferred sit-stand-sit, bed to chair, stand-sit-stand from toilet. Ambulated in the room 65 ft. with Rw, gait belt and contact guard assistance, 50% weight bearing on R, cues for R swing and straight R knee with stance. Patient's mobility was on target for POD#1. Will address more exercises, increase gait distance, negotiate stairs and assess for discharge at pm PT session. She will benefit from [address] PT in order to achieve maximum level of safe, functional mobility, balance and return to independent PLOF. Patient will benefit from skilled intervention to address the above impairments. Patient?s rehabilitation potential is considered to be Good  Factors which may influence rehabilitation potential include:   ? None noted  ? Mental ability/status  ? Medical condition  ? Home/family situation and support systems  ? Safety awareness  ? Pain tolerance/management  ? Other:      PLAN :  Recommendations and Planned Interventions:  ?           Bed Mobility Training             ? Neuromuscular Re-Education  ? Transfer Training                   ? Orthotic/Prosthetic Training  ? Gait Training                         ? Modalities  ? Therapeutic Exercises           ? Edema Management/Control  ? Therapeutic Activities            ? Patient and Family Training/Education  ? Other (comment):    Frequency/Duration: Patient will be followed by physical therapy  twice daily to address goals. Discharge Recommendations: Home Health  Further Equipment Recommendations for Discharge: Rolling walker ordered for patient. SUBJECTIVE:   Patient stated ? I would really like to go home this afternoon after I practice steps. ?    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 HX GI  circa 2005    Small bowel resection after small bowel obstruction; Dr. Monzon Pain toe surgery. HX TONSILLECTOMY      HX TUBAL LIGATION  circa 1972    after 2 live births     Prior Level of Function/Home Situation: PLOF: Independent with ADLs and IADLs. Lives alone in one story home, 3 steps with rail to enter. Patient has an elderly sister but has hired a personal care aide from [facility] to drive her home and stay with her for as long as needed. Personal factors and/or comorbidities impacting plan of care: A & O x 4/Motivated    Home Situation  Home Environment: Private residence  One/Two Story Residence: Two story, live on 1st floor  Living Alone: No  Support Systems: Family member(s)  Patient Expects to be Discharged to[de-identified] Private residence  Current DME Used/Available at Home: None    EXAMINATION/PRESENTATION/DECISION MAKING:   Critical Behavior:  Neurologic State: Alert  Orientation Level: Oriented X4  Cognition: Appropriate decision making, Appropriate for age attention/concentration, Appropriate safety awareness, Follows commands     Hearing:   Auditory  Auditory Impairment: None      Range Of Motion:  AROM: Generally decreased, functional           PROM: Generally decreased, functional           Strength:    Strength: Generally decreased, functional                    Tone & Sensation:   Tone: Normal              Sensation: Intact               Coordination:  Coordination: Within functional limits  Vision:      Functional Mobility:  Bed Mobility:  Rolling: Minimum assistance  Supine to Sit: Contact guard assistance  Sit to Supine: Contact guard assistance  Scooting: Contact guard assistance  Transfers:  Sit to Stand: Contact guard assistance  Stand to Sit: Contact guard assistance        Bed to Chair: Contact guard assistance              Balance:   Sitting: Intact  Standing: Impaired; Without support  Standing - Static: Good;Constant support  Standing - Dynamic : Good;Constant support  Ambulation/Gait Training:  Distance (ft): 65 Feet (ft)  Assistive Device: Walker, rolling;Gait belt  Ambulation - Level of Assistance: Contact guard assistance        Gait Abnormalities: Antalgic; Step to gait  Right Side Weight Bearing: As tolerated;Partial (%)(50% per surgeon)     Base of Support: Narrowed  Stance: Right decreased  Speed/Polina: Slow  Step Length: Right shortened;Left shortened  Swing Pattern: Right asymmetrical                 Therapeutic Exercises:   Performed post ELMA exercises as follows: ankle pumps/quad sets/glut sets/assisted heel slides/assisted hip abd-add, all performed x 10 reps. Functional Measure:  Barthel Index:    Bathin  Bladder: 10  Bowels: 10  Groomin  Dressin  Feeding: 10  Mobility: 0  Stairs: 0  Toilet Use: 5  Transfer (Bed to Chair and Back): 10  Total: 55/100       Percentage of impairment   0%   1-19%   20-39%   40-59%   60-79%   80-99%   100%   Barthel Score 0-100 100 99-80 79-60 59-40 20-39 1-19   0     The Barthel ADL Index: Guidelines  1. The index should be used as a record of what a patient does, not as a record of what a patient could do. 2. The main aim is to establish degree of independence from any help, physical or verbal, however minor and for whatever reason. 3. The need for supervision renders the patient not independent. 4. A patient's performance should be established using the best available evidence. Asking the patient, friends/relatives and nurses are the usual sources, but direct observation and common sense are also important. However direct testing is not needed.   5. Usually the patient's performance over the preceding 24-48 hours is important, but occasionally longer periods will be relevant. 6. Middle categories imply that the patient supplies over 50 per cent of the effort. 7. Use of aids to be independent is allowed. Jaki Marquez., Barthel, D.W. ([id number]). Functional evaluation: the Barthel Index. [facility] (14)2. RL Payne, Margot Velazquez., Umberto Chavira., [place], [address] (1999). Measuring the change indisability after inpatient rehabilitation; comparison of the responsiveness of the Barthel Index and Functional [place] Measure. Journal of Neurology, Neurosurgery, and Psychiatry, 66(4), 426-761. Robin Collins, N.J.A, ALLISON Mcfarlane, & Irvin Brower, M.A. (2004.) Assessment of post-stroke quality of life in cost-effectiveness studies: The usefulness of the Barthel Index and the EuroQoL-5D. Quality of Life Research, 15, 334-71           Physical Therapy Evaluation Charge Determination   History Examination Presentation Decision-Making   LOW Complexity : Zero comorbidities / personal factors that will impact the outcome / POC LOW Complexity : 1-2 Standardized tests and measures addressing body structure, function, activity limitation and / or participation in recreation  LOW Complexity : Stable, uncomplicated  LOW Complexity : FOTO score of       Based on the above components, the patient evaluation is determined to be of the following complexity level: LOW       Activity Tolerance:   Good    After treatment:   ?         Patient left in no apparent distress sitting up in chair  ? Patient left in no apparent distress in bed  ? Call bell left within reach  ? Nursing notified  ? Caregiver present  ? Bed alarm activated    COMMUNICATION/EDUCATION:   The patient?s plan of care was discussed with: Occupational Therapist, Registered Nurse and . ?         Fall prevention education was provided and the patient/caregiver indicated understanding.   ? Patient/family have participated as able in goal setting and plan of care. ?         Patient/family agree to work toward stated goals and plan of care. ?         Patient understands intent and goals of therapy, but is neutral about his/her participation. ? Patient is unable to participate in goal setting and plan of care.     Thank you for this referral.  Traci Ortiz   Time Calculation: 40 mins

## 2019-05-30 NOTE — PROGRESS NOTES
Paged Abril. Patient would like to go home today. If PT clears patient to go home can we change discharge order from 5/31/19 to today 5/30/2019?

## 2019-05-30 NOTE — PROGRESS NOTES
Problem: Falls - Risk of  Goal: *Absence of Falls  Description  Document [place] Fall Risk and appropriate interventions in the flowsheet.   Outcome: Progressing Towards Goal     Problem: Hip Replacement: Post Op Day 1  Goal: *Hemodynamically stable  Outcome: Progressing Towards Goal

## 2019-05-30 NOTE — ROUTINE PROCESS
Bedside shift change report given to [address] [place] [address] [address] (oncoming nurse) by Shelley Moore (offgoing nurse). Report included the following information SBAR.

## 2019-05-30 NOTE — PROGRESS NOTES
Care Management Interventions  PCP Verified by CM: Yes  Palliative Care Criteria Met (RRAT>21 & CHF Dx)?: No  Mode of Transport at Discharge: Other (see comment)  Transition of Care Consult (CM Consult): Discharge Planning  MyChart Signup: No  Discharge Durable Medical Equipment: No  Health Maintenance Reviewed: Yes  Physical Therapy Consult: Yes  Occupational Therapy Consult: Yes  Speech Therapy Consult: No  Current Support Network: Own Home, Lives Alone  Confirm Follow Up Transport: Family  Plan discussed with Pt/Family/Caregiver: Yes  [place] Resource Information Provided?: No  Discharge Location  Discharge Placement: Home with home health    Reason for Admission:    DJD right hip                   RRAT Score:          8           Plan for utilizing home health: At home care                    Current Advanced Directive/Advance Care Plan:  Not on file. Likelihood of Readmission:  low                         Transition of Care Plan:                    Chart reviewed for transitions of care, and discussed in rounds. CM met with patient at bedside to explain role and offer support. Patient is alert and oriented x4, and confirmed demographics. She lives alone in a 2 story house with 3 steps to enter, and 14 steps inside her home. Patient stated Josephine Salinas will transport her home, but would like referral sent to [facility].  There are no other discharge needs at this time, [facility].    Mariama TavarezMeadowbrook Rehabilitation Hospital

## 2019-05-30 NOTE — PROGRESS NOTES
Occupational Therapy EVALUATION/discharge  Patient: Wanda Carrizales (45 y.o. female)  Date: 5/30/2019  Primary Diagnosis: Osteoarthritis of right hip [M16.11]  Procedure(s) (LRB):  RIGHT TOTAL HIP REPLACEMENT (Right) 1 Day Post-Op   Precautions:   Fall, PWB, Total hip    ASSESSMENT:   Based on the objective data described below, the patient presents with good performance with bathing and dressing following R THR. Pt provided with education and training for use of Hip kit for home. She did well with all activities completing dressing with supervision, mobility with CG and cues, and discussed home safety tasks. Pt independent with education provided. Pt will have support from hired help for home. Further skilled acute occupational therapy is not indicated at this time. Discharge Recommendations: None  Further Equipment Recommendations for Discharge: none      SUBJECTIVE:   Patient stated I am feeling good. 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  HX GI  circa 2005    Small bowel resection after small bowel obstruction; Dr. Naseem Vaughn toe surgery.  HX TONSILLECTOMY      HX TUBAL LIGATION  circa 1972    after 2 live births       Prior Level of Function/Environment/Context: pt is independent at baseline. She lives alone.    Expanded or extensive additional review of patient history:     Home Situation  Home Environment: Private residence  One/Two Story Residence: Two story, live on 1st floor  Living Alone: No  New [place]: Family member(s)  Patient Expects to be Discharged to[de-identified] Private residence  Current DME Used/Available at Home: None    Hand dominance: Right    EXAMINATION OF PERFORMANCE DEFICITS:  Cognitive/Behavioral Status:  Neurologic State: Alert  Orientation Level: Oriented X4  Cognition: Appropriate for age attention/concentration  Perception: Appears intact  Perseveration: No perseveration noted  Safety/Judgement: Good awareness of safety precautions    Skin: see nursing notes    Edema: none noted    Hearing: Auditory  Auditory Impairment: None    Vision/Perceptual:                           Acuity: Within Defined Limits         Range of Motion:    AROM: Within functional limits  PROM: Within functional limits                      Strength:    Strength: Within functional limits                Coordination:  Coordination: Within functional limits  Fine Motor Skills-Upper: Right Intact; Left Intact    Gross Motor Skills-Upper: Right Intact; Left Intact    Tone & Sensation:    Tone: Normal  Sensation: Intact                      Balance:  Sitting: Intact  Standing: Intact; With support  Standing - Static: Good  Standing - Dynamic : Good    Functional Mobility and Transfers for ADLs:  Bed Mobility:  Rolling: Contact guard assistance  Supine to Sit: Contact guard assistance  Sit to Supine: Contact guard assistance  Scooting: Contact guard assistance    Transfers:  Sit to Stand: Contact guard assistance  Stand to Sit: Contact guard assistance  Bed to Chair: Contact guard assistance  Bathroom Mobility: Contact guard assistance  Toilet Transfer : Contact guard assistance    ADL Assessment:  Feeding: Supervision    Oral Facial Hygiene/Grooming: Supervision    Bathing: Minimum assistance    Upper Body Dressing: Supervision    Lower Body Dressing: Supervision    Toileting: Contact guard assistance                ADL Intervention and task modifications:     Energy conservation training:  Pts also participated with energy conservation training including but not limited to the following:  rest as needed throughout the day, avoid sitting for long periods of time to prevent fatigue, use good posture when sitting and standing, plan rest periods during the day, and prioritize tasks to ensure optimal energy to complete all needed tasks. Adaptive Equipment Training:  Pt educated on use of AE to complete lower body dressing. Pt received skilled instruction on the following tasks: donning socks, donning pants, and donning shoes. Discussed dressing surgical LE first to maximize independence. Provided education for energy conservation and pain management for sit to stand to complete lower body dressing tasks. Pt demonstrated good understanding of training provided. IADL training:   Discussed at length precautions with IADL tasks. Discussed body alignment and ensuring pt does not twist hips/knees to ensure proper body alignment. Discussed finger tip rule for daily activities and to use a reacher for all tasks that are out of reach. Pt discussed to avoid tasks such as sweeping, mopping, vacuuming, changing bed linens, carrying a laundry basket, reaching into a low oven, or cleaning showers and toilets. Pt verbalized understanding of instructions. Did encourage pt to stand at sink for grooming, washing dishes, and light meal preparations to increase overall standing tolerance and independence with all activities. Cognitive Retraining  Safety/Judgement: Good awareness of safety precautions    Functional Measure:  Barthel Index:    Bathin  Bladder: 10  Bowels: 10  Groomin  Dressin  Feeding: 10  Mobility: 10  Stairs: 5  Toilet Use: 10  Transfer (Bed to Chair and Back): 10  Total: 75/100        Percentage of impairment   0%   1-19%   20-39%   40-59%   60-79%   80-99%   100%   Barthel Score 0-100 100 99-80 79-60 59-40 20-39 1-19   0     The Barthel ADL Index: Guidelines  1.  The index should be used as a record of what a patient does, not as a record of what a patient could do. 2. The main aim is to establish degree of independence from any help, physical or verbal, however minor and for whatever reason. 3. The need for supervision renders the patient not independent. 4. A patient's performance should be established using the best available evidence. Asking the patient, friends/relatives and nurses are the usual sources, but direct observation and common sense are also important. However direct testing is not needed. 5. Usually the patient's performance over the preceding 24-48 hours is important, but occasionally longer periods will be relevant. 6. Middle categories imply that the patient supplies over 50 per cent of the effort. 7. Use of aids to be independent is allowed. Bryce Baldwin., Barthel, D.W. ([id number]). Functional evaluation: the Barthel Index. [facility] (14)2. RL Diaz, Sarah Holbrook., Rickey Alanis., [place], [facility] (1999). Measuring the change indisability after inpatient rehabilitation; comparison of the responsiveness of the Barthel Index and Functional [place] Measure. Journal of Neurology, Neurosurgery, and Psychiatry, 66(4), 464-085. Gary Jenkins, N.J.A, PRADEEP Mcfarlane.HILLARY, & Chris Dumont MMACEY. (2004.) Assessment of post-stroke quality of life in cost-effectiveness studies: The usefulness of the Barthel Index and the EuroQoL-5D.  Quality of Life Research, 15, 160-98       Occupational Therapy Evaluation Charge Determination   History Examination Decision-Making   LOW Complexity : Brief history review  LOW Complexity : 1-3 performance deficits relating to physical, cognitive , or psychosocial skils that result in activity limitations and / or participation restrictions  LOW Complexity : No comorbidities that affect functional and no verbal or physical assistance needed to complete eval tasks       Based on the above components, the patient evaluation is determined to be of the following complexity level: LOW   Pain:  Pain Scale 1: Numeric (0 - 10)  Pain Intensity 1: 3  Pain Location 1: Hip  Pain Orientation 1: Right  Pain Description 1: Aching  Pain Intervention(s) 1: Ice;Rest  Activity Tolerance:   VSS throughout session. After treatment:   [x]  Patient left in no apparent distress sitting up in chair  []  Patient left in no apparent distress in bed  [x]  Call bell left within reach  [x]  Nursing notified  []  Caregiver present  []  Bed alarm activated    COMMUNICATION/EDUCATION:   Communication/Collaboration:  [x]      Home safety education was provided and the patient/caregiver indicated understanding. [x]      Patient/family have participated as able and agree with findings and recommendations. []      Patient is unable to participate in plan of care at this time.   Findings and recommendations were discussed with: Physical Therapist and Registered Nurse    Radha Cunningham OT  Time Calculation: 40 mins

## 2019-05-30 NOTE — PROGRESS NOTES
Ortho Daily Progress Note    5/30/2019  10:54 AM    POD:  1 Day Post-Op  S/P:  Procedure(s):  RIGHT TOTAL HIP REPLACEMENT    Afebrile/VSS, NAD, A&O x 3  Doing well without complaints of nausea  Pain well controlled  Calves soft/NTTP Bilaterally  Incision OK, no drainage or dehiscence. Thigh soft  Dressing clean and dry  Moving lower extremities well. Neurocirculatory exam intact and within normal range. Lab Results   Component Value Date/Time    HGB 10.7 (L) 05/30/2019 03:35 AM    INR 1.4 (H) 05/30/2019 03:35 AM     Recent Labs     05/30/19  0335 05/23/19  0842   CREA 0.82 0.63   BUN 11 21*     Estimated Creatinine Clearance: 54.5 mL/min (based on SCr of 0.82 mg/dL).     PLAN:  DVT prophylaxis: Xarelto  WBAT with PT-mobilization  Pain Control- tylenol, oxycodone, ice  Plan to D/C home with HH/PT likely tomorrow      STEFAN Zarate

## 2019-05-30 NOTE — DISCHARGE INSTRUCTIONS
DC Orders All Total Hips    Case Management for DC planning to Home HC .   - PT 2 times a week for 2 weeks; 50% WBAT with AVOID sudden and extreme movement of your hip (surgical leg). - Xarelto therapy once daily for 35 days post-operatively              - Remove AQUACEL bandage on the 7th day post-operatively (PLEASE reference discharge instructions INCISION CARE for additional information). .   - Remove staples at 2 weeks post-op; 6/12/19 .   - Follow up in Office in 2 weeks. After Hospital Care Plan:  Discharge Instructions Hip Replacement-Dr. Katty Brunson    Patient Name: Corie Camejo  Date of procedure: 5/29/2019    Procedure: Procedure(s):  RIGHT TOTAL HIP REPLACEMENT  Surgeon: Tatianna Whatley) and Role:     Arpan Gonzalez MD - Primary     PCP: Sun Lugo MD  Date of discharge: No discharge date for patient encounter. Follow up appointments   Follow up with Dr. Katty Brunson in 2 weeks. Call [phone] to make an appointment.  If home health has been arranged for you the agency will contact you to arrange dates/times for visits. Please call them if you do not hear from them within 24 hours after you are discharged    When to call your Orthopaedic Surgeon: Call [phone].  If you call after 5pm or on a weekend, the on call physician will be contacted   Increased hip pain, unrelieved pain or if you have difficulty or are unable to walk   Signs of infection-if your incision is red; continues to have drainage; drainage has a foul odor or if you have a persistent fever over 101 degrees   Signs of a blood clot in your leg-calf pain, tenderness, redness, swelling of lower leg    When to call your Primary Care Physician:   Concerns about medical conditions such as diabetes, high blood pressure, asthma, congestive heart failure   Call if blood sugars are elevated, persistent headache or dizziness, coughing or congestion, constipation or diarrhea, burning with urination, abnormal heart rate    When to call 911and go to the nearest emergency room   acute onset of chest pain, shortness of breath, difficulty breathing    Activity   50% weight bearing with walker or crutches for 2 weeks, then as tolerated. Refer to your handbook for instructions and pictures   Complete you Home Exercise Program daily as instructed by your therapist. Refer to your handbook for instructions and pictures   Get up every one hour and walk (except at night when sleeping)   AVOID sudden and extreme movement of your hip (surgical leg)   Do not drive or operate heavy machinery    Incision Care   The Aquacel (brown, waterproof) surgical dressing is to remain on your hip for 7 days. On the 7th day have someone gently peel the dressing off by carefully lifting the edge and stretching it slightly to break the adhesive seal   If the home health agency has not removed the Aquacel bandage by the 7th day please remove it yourself   You will have staples in your hip incision. They will be removed by the home health agency staff   If your Aquacel dressing comes loose/off before the 7th day, you may replace it with a dry sterile gauze dressing; change it daily. Once your incision is not draining, you may leave it open to air   You may take a shower with the Aquacel dressing in place. Once the Aquacel is removed, you may shower and get your incision wet but do not submerge your incision under water in a bath tub, hot tub or swimming pool for 6 weeks after surgery. Preventing blood clots   Take Xarelto 10 mg once daily for 35 days post-operatively   Wear elastic stockings (TEDS) for 4 weeks.   You should remove them for approximately 1 hour daily for showering/sponge bathing    Pain management   Keep ice wrap in place except when walking; changing gel packs every 4 hours   Lie down and elevate your leg on pillows for about 30 minutes after walking to decrease swelling and pain    Do ankle pumps (10 repetitions) every hour while awake and get up frequently to walk    Take Tylenol 500mg every 6 hours for 2 weeks    Take narcotic pain pill as prescribed if needed. Take with food; avoid alcohol while taking pain medication. Decrease the amount of narcotic pain medication as your pain lessens     Diet   Resume usual diet; drink plenty of fluids; eat foods high in fiber   You may want to take a stool softener (such as Senokot-S or Colace) to prevent constipation while you are taking pain medication.   If constipation occurs, take a laxative (such as Dulcolax tablets, Milk of Magnesia, or a suppository)

## 2019-05-30 NOTE — PROGRESS NOTES
I have reviewed discharge instructions with the patient. The patient verbalized understanding. Signs,symptoms and side effects were discussed. Opportunity for questions and clarification provided. The Esignature pad was not working on the computer. A signed copy is on the patients chart. Patient left via private vehicle. Discharged to home with home health.

## 2019-05-31 ENCOUNTER — PATIENT OUTREACH (OUTPATIENT)
Dept: CASE MANAGEMENT | Age: 79
End: 2019-05-31

## 2019-05-31 NOTE — OP NOTES
[facility]  OPERATIVE REPORT    Name:  Noé Garcia  MR#:  [id number]  :  1940  ACCOUNT #:  [de-identified]  DATE OF SERVICE:  2019    PREOPERATIVE DIAGNOSES:  1. Advanced degenerative arthritis of right hip. 2.  Avascular necrosis, right femoral head. POSTOPERATIVE DIAGNOSES:  1. Advanced degenerative arthritis of right hip. 2.  Avascular necrosis, right femoral head. PROCEDURE PERFORMED:  Right total hip replacement. SURGEON:  Salma Carias MD    ASSISTANT:  Tushar Bryant PA-C    ANESTHESIA:  Spinal.    COMPLICATIONS:  None. SPECIMENS REMOVED:  Right femoral head. IMPLANTS:  Right total hip components as listed in operative report. ESTIMATED BLOOD LOSS:  200 mL. DRAINS:  None. INDICATIONS:  The patient has advanced degenerative arthritis of the right hip. She also has changes consistent with avascular necrosis of the right femoral head. Having failed conservative treatment, she now presents for the above procedure. PROCEDURE:  On day of operation, the patient was taken to the operating room. Spinal anesthesia administered. The patient was turned to the left lateral decubitus position and positioned with [facility] positioner. The right hip was prepped and draped in the usual fashion. Antibiotics were given. The consent was confirmed. A mini posterolateral incision was made over the hip. It was carried through subcutaneous tissue. The tensor fascia miguel ángel was sharply divided. The fascia of the gluteal muscle was divided in line with their fibers. The [place] retractor was carefully placed. External rotator muscles were taken down. A T-shaped posterior capsular incision was made. The hip was dislocated noting marked degenerative changes with essentially complete loss of cartilage from the femoral head. Oscillating saw was used to make the femoral neck osteotomy. Retractors were carefully placed around the acetabulum.   The acetabulum was cleared of soft tissue and osteophytes. The acetabulum was then reamed to 53 mm and felt to have good coverage and good bone quality at this point. A 54 mm [place] [employer] technology acetabular component was press fit into place and felt to have a tight fit with proper position. One superior screw was placed. 36 mm trial liner placed. Attention was then turned to the femur. Box osteotome was utilized. The femur was then reamed to #7 in the [place] system. It was broached to #7 and felt to have a tight fit. X-ray revealed proper position of the components. Various head and neck trials were utilized. The high offset 8.5 provided the best fit, range of motion, with proper leg lengths and proper stability in all planes of motion. Some anterior tissue was removed to prevent impingement. The trial components were then removed. The hip was thoroughly irrigated with pulse irrigation as well as antibiotic solution. 36 mm polyethylene liner placed in the acetabulum. Attention then turned to the femur. The high offset #7 [place] [employer] femoral stem was press fit into the femur and felt to have a tight fit. The 8.5 head was felt to be appropriate and therefore the 8.5 ceramic 36 mm head was introduced and reduced and felt to be stable. Incisions were thoroughly irrigated, coagulation achieved with electrocautery. Posterior capsule repaired with #1 Vicryl suture. Fascia closed with #1 Vicryl, supplemented with #2 PDS. 2-0 Vicryl was used for subcutaneous tissue and staples used to close the skin. Sterile dressings applied. The patient was taken to the recovery room in satisfactory condition. The assistant Yulisa De La Garza PA-C, assisted me with positioning, retraction, implant installation, and incision closure.       Stew Medley MD [id number]  D:  05/30/2019 11:52  T:  05/30/2019 12:01  JOB #:  [id number]  CC:  MD Angella Gibbons Umair Lemus MD

## 2019-05-31 NOTE — PROGRESS NOTES
This note will not be viewable in [address] Post Discharge Follow-up contact after Joint Replacement    Patient discharged on 5/30/19  By  Layla Calles   following  right hip Arthroplasty. Spoke with patient today, who reports they are \" expecting the home health nurse and therapist to be here this afternoon. I am doing wonderfully. \"  Denies Fever, Shortness of Breath or Chest Pain. Home Health has not visited. Patient also reports:. Incision  clean, dry, intact  Calf is non-tender,   operative extremity has minimal swelling. Pain is well managed. Discussed use of ice & elevation. Patient is exercising independently. Taking Xarelto for anticoagulation, Tylenol for pain. Patient is not experiencing symptoms of constipation & urinating without difficulty. Discussed side effects of anticoagulants & pain medications (bleeding/bruising, constipation, lightheaded/dizziness)  Follow up appointment is not scheduled; but patient states plan to schedule. Discussed calling surgeon Dr Sho Guerra  for drainage, bleeding, swelling in operative extremity, fever or pain. Discussed calling PCP Dr Meryle Kea with other medical issues.

## 2019-05-31 NOTE — NURSE NAVIGATOR
[address]  [facility] Bundle Handoff     FROM:                                TO: At [address]                                                      ([address] [place] [address] or Facility name)  [address]  [address] [phone]  Dept: [address]: [phone]                                      Room#:  843/07                                                       Nurse Navigator:  Tanner Scott RN         SITUATION      ASAScore: ASA 2 - Patient with mild systemic disease with no functional limitations    Admitted:  5/29/2019  Hospital Day: 2      Attending Provider:  No att. providers found     Consultations:  None    PCP:  Chris Rosario MD   [phone]     Admitting Dx:  Osteoarthritis of right hip [M16.11]       Principal Problem:    Primary localized osteoarthritis of right hip (5/23/2019)    Active Problems:    Osteoarthritis of right hip (5/29/2019)      2 Days Post-Op of   Procedure(s):  RIGHT TOTAL HIP REPLACEMENT   BY: Tariq Castillo MD             ON: 5/29/2019                  Code Status: Prior             Advance Directive? Yes Not W Pt (Send w/patient)     Isolation:  There are currently no Active Isolations       MDRO: No current active infections    BACKGROUND     Allergies: Allergies   Allergen Reactions    Celecoxib Nausea and Vomiting    Sulfa (Sulfonamide Antibiotics) Nausea and Vomiting    Darvocet A500 [Propoxyphene N-Acetaminophen] Hives       Past Medical History:   Diagnosis Date    Arthritis     Diverticulosis     Eczema     Environmental allergies     GERD (gastroesophageal reflux disease)     Ill-defined condition     glaucoma    Rosacea     Stroke ([facility] [address] 2015    left eye blockage, loss of 1/3 sight in left eye       Past Surgical History:   Procedure Laterality Date    HX APPENDECTOMY  48    HX GI  circa 1989    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     HX GI  circa 2005    Small bowel resection after small bowel obstruction; Dr. Fortunato Moore toe surgery.  HX TONSILLECTOMY      HX TUBAL LIGATION  circa     after 2 live births       Prior to Admission Medications   Prescriptions Last Dose Informant Patient Reported? Taking? ACETAMINOPHEN EXTRA STRENGTH PO 2019 at Unknown time  Yes Yes   Sig: Take 1,000 mg by mouth three (3) times daily as needed. CALCIUM CARBONATE (TUMS PO) Unknown at Unknown time  Yes No   Sig: Take  by mouth as needed. 750mg tab. 1/2 tab twice daily as needed. FEXOFENADINE HCL (ALLEGRA PO) 2019 at Unknown time  Yes Yes   Sig: Take 180 mg by mouth daily. IPRATROPIUM BROMIDE (ATROVENT NA) 2019 at Unknown time  Yes Yes   Si Sprays by Both Nostrils route as needed. OTHER 2019 at Unknown time  Yes Yes   Sig: Every 3 wk allergy iinjections   aspirin (ASPIRIN) 325 mg tablet 2019  Yes No   Sig: Take 325 mg by mouth daily. coated   cholecalciferol (VITAMIN D3) 1,000 unit tablet 5/15/2019  Yes No   Sig: Take 1,000 Units by mouth daily. denosumab (PROLIA) 60 mg/mL injection 2019  Yes No   Si mg by SubCUTAneous route every 6 months. Indications: LAST DOSE MAY 2019   latanoprost (XALATAN) 0.005 % ophthalmic solution 2019 at Unknown time  Yes Yes   Sig: Administer 1 Drop to both eyes nightly. metroNIDAZOLE (METROGEL) 1 % topical gel 2019 at Unknown time  Yes Yes   Sig: Apply  to affected area daily. Use a thin layer to affected areas after washing    raNITIdine (ZANTAC 75) 75 mg tab 2019 at Unknown time  Yes Yes   Sig: Take  by mouth as needed. Facility-Administered Medications: None       Vaccinations: There is no immunization history on file for this patient. ASSESSMENT   Age: 70 y.o. Gender: female        Height: Height: 5' 10\" (177.8 cm)                    Weight:Weight: 59.1 kg (130 lb 6 oz)     No data found.          Active Orders There are no active orders of the following types: Diet. Orientation: Orientation Level: Oriented X4    Active Lines/Drains:  (Peg Tube / Crocker / CL or S/L?):no    Urinary Status: Voiding      Last BM: Last Bowel Movement Date: 05/29/19     Skin Integrity: Intact, Incision (comment)(R hip surgical incision)             Mobility: Slightly limited   Weight Bearing Status: WBAT (Weight Bearing as Tolerated)      Gait Training  Assistive Device: Walker, rolling, Gait belt  Ambulation - Level of Assistance: Contact guard assistance  Distance (ft): 150 Feet (ft)  Stairs - Level of Assistance: Contact guard assistance  Number of Stairs Trained: 4  Rail Use: Left (left rail and cane up, right rail and cane down)     On Anticoagulation? YES  Xarelto                                         Pain Medications given:  Oxycodone                                   Lab Results   Component Value Date/Time    Glucose 115 (H) 05/30/2019 03:35 AM    Hemoglobin A1c 5.5 05/23/2019 08:42 AM    INR 1.4 (H) 05/30/2019 03:35 AM    INR 1.0 05/23/2019 08:42 AM    HGB 10.7 (L) 05/30/2019 03:35 AM    HGB 12.9 05/23/2019 08:42 AM       Readmission Risks:  Score:         RECOMMENDATION     See After Visit Summary (AVS) for:  · Discharge instructions  · After [address]   · Medication Reconciliation          [facility] Orthopaedic Nurse Navigator  RUBIO Das, LIZZY-BC       Office  [phone]  Cell      [phone]  Fax      [phone]  [email or web address]

## 2019-06-03 NOTE — DISCHARGE SUMMARY
Discharge Summary    Physician: Farrah Whaley MD    Physician Assistant: Luanna Holter, PA-C    Admit Diagnosis:  Osteoarthritis of right hip [M16.11]    Final Diagnosis:   1. Advanced degenerative arthritis of right hip . Procedure: Right total hip replacement    Complications: None. History of Present Illness: The patient has a long-standing history of pain within the right hip . The patient has severe degenerative arthritis of the right hip and has exhausted conservative therapy at this time including prior intra-articular injections, activity modifications, OTC NSAIDS. The patient has been limited in their ability to perform ADLs, walk short distances or climb steps appropriately. The patient presents for the above-mentioned procedure. The patient has been cleared from a medical and cardiac standpoint. Past Medical History:  Recorded in the chart. Physical Examination: Also recorded in the chart. The patient is noted for ambulating with an antalgic gait favoring the right side. Examination of the right hip reveals she ambulates slowly favoring the right hip. Pain is more anteriorly. Any attempts to rotate the right hip elicits pain. No effusion. No sign of infection. No ecchymosis or erythema. No cellulitis or rash. I detect no obvious motor or sensory deficits in the lower extremities. The neurovascular status is intact. MRI reveal 1. Bone marrow edema within the right femoral head with linear subchondral hypointensity in the superolateral femoral head, which may be related to osteonecrosis versus a subchondral insufficiency fracture.    2. There is a second, somewhat bandlike region of bone marrow edema within the medial intertrochanteric right proximal femur, which is indeterminant. No definite fracture line is identified, and these changes may be reactive, but cannot entirely exclude the possibility of an incomplete fracture  3.  Moderate to severe right hip chondrosis with a right hip joint effusion and likely synovitis.    4. Severe lower lumbar degenerative disc disease.    5. Mild tendinopathy of the abductor tendons with mild left greater trochanteric bursitis. Course in the Hospital:  The patient was admitted to the hospital.  The Pt. Underwent a right total hip replacement . Postoperatively, the pt. did well. The pt. Remained stable from a medical standpoint. The patient ambulated, 50% WBAT weightbearing within the hospital with Physical Therapy. The patient continued with this therapy at  [facility] with PT. The patient began taking Xarelto post-operatively for DVT prophylaxis and will continue on this for 35 days. At the time of discharge, there was no evidence of any DVT noted. The patient had negative Homans signs bilateral lower extremities. At the time of discharge, the patient's right hip incision appeared with staples intact. No signs of infection or inflammation noted. The patient will follow up in our office in 2-1/2 weeks. DC med list:  Discharge Medication List as of 5/30/2019  4:04 PM      START taking these medications    Details   rivaroxaban (XARELTO) 10 mg tablet Take 1 Tab by mouth daily (with lunch) for 35 days. Indications: deep vein thrombosis prevention in hip surgery, Print, Disp-35 Tab, R-0      oxyCODONE IR (ROXICODONE) 5 mg immediate release tablet Take 0.5-1 Tabs by mouth every four (4) hours as needed for Pain for up to 7 days.  Max Daily Amount: 30 mg., Print, Disp-40 Tab, R-0         CONTINUE these medications which have NOT CHANGED    Details   raNITIdine (ZANTAC 75) 75 mg tab Take  by mouth as needed., Historical Med      IPRATROPIUM BROMIDE (ATROVENT NA) 2 Sprays by Both Nostrils route as needed., Historical Med      ACETAMINOPHEN EXTRA STRENGTH PO Take 1,000 mg by mouth three (3) times daily as needed., Historical Med      latanoprost (XALATAN) 0.005 % ophthalmic solution Administer 1 Drop to both eyes nightly., Historical Med      metroNIDAZOLE (METROGEL) 1 % topical gel Apply  to affected area daily. Use a thin layer to affected areas after washing , Historical Med      FEXOFENADINE HCL (ALLEGRA PO) Take 180 mg by mouth daily. , Historical Med      OTHER Every 3 wk allergy iinjections, Historical Med      cholecalciferol (VITAMIN D3) 1,000 unit tablet Take 1,000 Units by mouth daily. , Historical Med      denosumab (PROLIA) 60 mg/mL injection 60 mg by SubCUTAneous route every 6 months. Indications: LAST DOSE MAY 2019, Historical Med      CALCIUM CARBONATE (TUMS PO) Take  by mouth as needed. 750mg tab.  1/2 tab twice daily as needed. , Historical Med         STOP taking these medications       aspirin (ASPIRIN) 325 mg tablet Comments:   Reason for Stopping:               Patient Disposition: Home  Followup Care:  Discharge Condition: good  PT/OT per Home Health  Regular Diet  As directed    Follow-up Information     Follow up With Specialties Details Why Contact Info    Ella Sifuentes MD [facility] [address]  [facility]       [facility] [address]   [address] [place] [address]                  Caden Leyva PA-C

## 2022-03-18 PROBLEM — M16.11 OSTEOARTHRITIS OF RIGHT HIP: Status: ACTIVE | Noted: 2019-05-29

## 2022-03-19 PROBLEM — M16.11 PRIMARY LOCALIZED OSTEOARTHRITIS OF RIGHT HIP: Status: ACTIVE | Noted: 2019-05-23

## 2022-03-23 ENCOUNTER — TRANSCRIBE ORDER (OUTPATIENT)
Dept: SCHEDULING | Age: 82
End: 2022-03-23

## 2022-03-23 DIAGNOSIS — M54.40 LOW BACK PAIN WITH SCIATICA: Primary | ICD-10-CM

## 2022-04-07 ENCOUNTER — HOSPITAL ENCOUNTER (OUTPATIENT)
Dept: MRI IMAGING | Age: 82
Discharge: HOME | End: 2022-04-07
Attending: ORTHOPAEDIC SURGERY
Payer: MEDICARE

## 2022-04-07 DIAGNOSIS — M54.40 LOW BACK PAIN WITH SCIATICA: ICD-10-CM

## 2022-04-07 PROCEDURE — 72148 MRI LUMBAR SPINE W/O DYE: CPT

## 2023-06-22 ENCOUNTER — HOSPITAL ENCOUNTER (OUTPATIENT)
Facility: HOSPITAL | Age: 83
Discharge: HOME | End: 2023-06-22
Payer: MEDICARE

## 2023-06-22 VITALS
BODY MASS INDEX: 19.04 KG/M2 | TEMPERATURE: 97.6 F | OXYGEN SATURATION: 96 % | HEART RATE: 71 BPM | WEIGHT: 133 LBS | RESPIRATION RATE: 18 BRPM | HEIGHT: 70 IN | SYSTOLIC BLOOD PRESSURE: 122 MMHG | DIASTOLIC BLOOD PRESSURE: 77 MMHG

## 2023-06-22 LAB
ABO + RH BLD: NORMAL
ALBUMIN SERPL-MCNC: 4.1 G/DL (ref 3.5–5)
ALBUMIN/GLOB SERPL: 1.4 (ref 1.1–2.2)
ALP SERPL-CCNC: 69 U/L (ref 45–117)
ALT SERPL-CCNC: 23 U/L (ref 12–78)
ANION GAP SERPL CALC-SCNC: 2 MMOL/L (ref 5–15)
APPEARANCE UR: CLEAR
AST SERPL-CCNC: 15 U/L (ref 15–37)
BACTERIA URNS QL MICRO: NEGATIVE /HPF
BASOPHILS # BLD: 0.1 K/UL (ref 0–0.1)
BASOPHILS NFR BLD: 1 % (ref 0–1)
BILIRUB SERPL-MCNC: 0.5 MG/DL (ref 0.2–1)
BILIRUB UR QL: NEGATIVE
BLOOD GROUP ANTIBODIES SERPL: NORMAL
BUN SERPL-MCNC: 23 MG/DL (ref 6–20)
BUN/CREAT SERPL: 31 (ref 12–20)
CALCIUM SERPL-MCNC: 9 MG/DL (ref 8.5–10.1)
CHLORIDE SERPL-SCNC: 108 MMOL/L (ref 97–108)
CO2 SERPL-SCNC: 31 MMOL/L (ref 21–32)
COLOR UR: COLORLESS
CREAT SERPL-MCNC: 0.74 MG/DL (ref 0.55–1.02)
DIFFERENTIAL METHOD BLD: ABNORMAL
EOSINOPHIL # BLD: 0.2 K/UL (ref 0–0.4)
EOSINOPHIL NFR BLD: 4 % (ref 0–7)
EPITH CASTS URNS QL MICRO: NORMAL /LPF
ERYTHROCYTE [DISTWIDTH] IN BLOOD BY AUTOMATED COUNT: 13.3 % (ref 11.5–14.5)
EST. AVERAGE GLUCOSE BLD GHB EST-MCNC: 111 MG/DL
GLOBULIN SER CALC-MCNC: 3 G/DL (ref 2–4)
GLUCOSE SERPL-MCNC: 77 MG/DL (ref 65–100)
GLUCOSE UR STRIP.AUTO-MCNC: NEGATIVE MG/DL
HBA1C MFR BLD: 5.5 % (ref 4–5.6)
HCT VFR BLD AUTO: 47.8 % (ref 35–47)
HGB BLD-MCNC: 14.6 G/DL (ref 11.5–16)
HGB UR QL STRIP: NEGATIVE
HYALINE CASTS URNS QL MICRO: NORMAL /LPF (ref 0–5)
IMM GRANULOCYTES # BLD AUTO: 0 K/UL (ref 0–0.04)
IMM GRANULOCYTES NFR BLD AUTO: 0 % (ref 0–0.5)
INR PPP: 1 (ref 0.9–1.1)
KETONES UR QL STRIP.AUTO: NEGATIVE MG/DL
LEUKOCYTE ESTERASE UR QL STRIP.AUTO: NEGATIVE
LYMPHOCYTES # BLD: 1 K/UL (ref 0.8–3.5)
LYMPHOCYTES NFR BLD: 16 % (ref 12–49)
MCH RBC QN AUTO: 28.7 PG (ref 26–34)
MCHC RBC AUTO-ENTMCNC: 30.5 G/DL (ref 30–36.5)
MCV RBC AUTO: 93.9 FL (ref 80–99)
MONOCYTES # BLD: 0.6 K/UL (ref 0–1)
MONOCYTES NFR BLD: 9 % (ref 5–13)
NEUTS SEG # BLD: 4.3 K/UL (ref 1.8–8)
NEUTS SEG NFR BLD: 70 % (ref 32–75)
NITRITE UR QL STRIP.AUTO: NEGATIVE
NRBC # BLD: 0 K/UL (ref 0–0.01)
NRBC BLD-RTO: 0 PER 100 WBC
PH UR STRIP: 6 (ref 5–8)
PLATELET # BLD AUTO: 216 K/UL (ref 150–400)
PMV BLD AUTO: 11.3 FL (ref 8.9–12.9)
POTASSIUM SERPL-SCNC: 3.8 MMOL/L (ref 3.5–5.1)
PROT SERPL-MCNC: 7.1 G/DL (ref 6.4–8.2)
PROT UR STRIP-MCNC: NEGATIVE MG/DL
PROTHROMBIN TIME: 10.5 SEC (ref 9–11.1)
RBC # BLD AUTO: 5.09 M/UL (ref 3.8–5.2)
RBC #/AREA URNS HPF: NORMAL /HPF (ref 0–5)
SODIUM SERPL-SCNC: 141 MMOL/L (ref 136–145)
SP GR UR REFRACTOMETRY: 1.01 (ref 1–1.03)
SPECIMEN EXP DATE BLD: NORMAL
URINE CULTURE IF INDICATED: NORMAL
UROBILINOGEN UR QL STRIP.AUTO: 0.2 EU/DL (ref 0.2–1)
WBC # BLD AUTO: 6.1 K/UL (ref 3.6–11)
WBC URNS QL MICRO: NORMAL /HPF (ref 0–4)

## 2023-06-22 PROCEDURE — 81001 URINALYSIS AUTO W/SCOPE: CPT

## 2023-06-22 PROCEDURE — 83036 HEMOGLOBIN GLYCOSYLATED A1C: CPT

## 2023-06-22 PROCEDURE — 85610 PROTHROMBIN TIME: CPT

## 2023-06-22 PROCEDURE — NSP99 NSP99 NON-BILLABLE CODE: Performed by: NURSE PRACTITIONER

## 2023-06-22 PROCEDURE — 80053 COMPREHEN METABOLIC PANEL: CPT

## 2023-06-22 PROCEDURE — 86900 BLOOD TYPING SEROLOGIC ABO: CPT

## 2023-06-22 PROCEDURE — 86850 RBC ANTIBODY SCREEN: CPT

## 2023-06-22 PROCEDURE — 85025 COMPLETE CBC W/AUTO DIFF WBC: CPT

## 2023-06-22 PROCEDURE — 86901 BLOOD TYPING SEROLOGIC RH(D): CPT

## 2023-06-22 PROCEDURE — 36415 COLL VENOUS BLD VENIPUNCTURE: CPT

## 2023-06-22 RX ORDER — ASPIRIN 325 MG
325 TABLET, DELAYED RELEASE (ENTERIC COATED) ORAL EVERY MORNING
COMMUNITY

## 2023-06-22 RX ORDER — CALCIUM CARBONATE 500 MG/1
1 TABLET, CHEWABLE ORAL DAILY PRN
COMMUNITY

## 2023-06-22 RX ORDER — IPRATROPIUM BROMIDE 21 UG/1
1 SPRAY, METERED NASAL 2 TIMES DAILY
COMMUNITY

## 2023-06-22 RX ORDER — FEXOFENADINE HCL 180 MG/1
180 TABLET ORAL EVERY MORNING
COMMUNITY

## 2023-06-22 ASSESSMENT — PAIN DESCRIPTION - PAIN TYPE: TYPE: CHRONIC PAIN

## 2023-06-22 ASSESSMENT — PAIN DESCRIPTION - ORIENTATION: ORIENTATION: RIGHT

## 2023-06-22 ASSESSMENT — PAIN DESCRIPTION - DESCRIPTORS: DESCRIPTORS: ACHING

## 2023-06-22 ASSESSMENT — PAIN DESCRIPTION - LOCATION: LOCATION: KNEE

## 2023-06-22 ASSESSMENT — PAIN - FUNCTIONAL ASSESSMENT: PAIN_FUNCTIONAL_ASSESSMENT: PREVENTS OR INTERFERES SOME ACTIVE ACTIVITIES AND ADLS

## 2023-06-22 ASSESSMENT — PAIN SCALES - GENERAL: PAINLEVEL_OUTOF10: 7

## 2023-06-22 NOTE — PERIOP NOTE
[address]  ORTHOPAEDIC    Surgery Date:   06-    Your surgeon's office or [facility] staff will call you between 4 PM- 8 PM the day before surgery with your arrival time. If your surgery is on a Monday, you will receive a call the preceding Friday. Please report to [facility] Patient Access/Admitting on the 1st floor. Bring your insurance card, photo identification, and any copayment (if applicable). If you are going home the same day of your surgery, you must have a responsible adult to drive you home. You need to have a responsible adult to stay with you the first 24 hours after surgery and you should not drive a car for 24 hours following your surgery. Do NOT eat any solid foods after midnight the night before surgery including candy, mints or gum. You may drink clear liquids from midnight until 1 hour prior to arrival time. You may drink up to 12 ounces at one time every 4 hours. Do NOT drink alcohol or smoke 24 hours before surgery. STOP smoking for 14 days prior as it helps with breathing and healing after surgery. If you are being admitted to the hospital,please leave personal belongings/luggage in your car until you have an assigned hospital room number. Please wear comfortable clothes. Wear your glasses instead of contacts. We ask that all money, jewelry and valuables be left at home. Wear no make up, particularly mascara, the day of surgery. All body piercings, rings, and jewelry need to be removed and left at home. Please remove any nail polish or artificial nails from your fingernails. Please wear your hair loose or down. Please no pony-tails, buns, or any metal hair accessories. If you shower the morning of surgery, please do not apply any lotions or powders afterwards. You may wear deodorant. Do not shave any body area within 24 hours of your surgery. Please follow all instructions to avoid any potential surgical cancellation.   Should your

## 2023-06-22 NOTE — PERIOP NOTE
ORTHO PRE OP AND MEDICATION INSTRUCTIONS PRINTED AND REVIEWED WITH PATIENT. TWO BOTTLES OF CHG SOAP GIVEN. SURGICAL SITE INFECTION SHEET GIVEN  OPPORTUNITY FOR QUESTIONS GIVEN AND ALL QUESTIONS WERE ANSWERED. REQUESTED CARDIAC NOTES AND ANY TESTING FROM DR. MANNY CONTRERAS-OFFICE.  RECEIVED CARDIAC NOTES, EKG AND TESTING PLACED ON MEDIAL CHART

## 2023-06-23 LAB
BACTERIA SPEC CULT: NORMAL
BACTERIA SPEC CULT: NORMAL
SERVICE CMNT-IMP: NORMAL

## 2023-06-23 NOTE — PERIOP NOTE
PAT Nurse Practitioner   Pre-Operative Chart Review/Assessment:-ORTHOPEDIC                Patient Name:  Teena Painter                                                           Age:   [de-identified] y.o.    :  1940     Today's Date:  2023     Date of PAT:   23      Date of Surgery:    23      Procedure(s):  Right Total Knee Arthroplasty     Surgeon:   Dr. Jaye Jackson                       PLAN:      1)  PCP:  Sonny Mitchell MD      2)  Cardiac Clearance:  Pt followed by Dr. Kenny Krabbe). ***       3)  Diabetic Treatment Consult:  Not indicated. A1c-5.5      4)  Sleep Apnea evaluation:   Not indicated. SAUD Score 1       5) Treatment for MRSA/Staph Aureus:  ***      6) Additional Concerns:  GERD, CVA, pt has DDNR(copy on chart)                Vital Signs:        Vitals:    23 0859   BP: 122/77   Pulse: 71   Resp: 18   Temp: 97.6 °F (36.4 °C)   SpO2: 96%             Body mass index is 19.36 kg/m².         ____________________________________________  PAST MEDICAL HISTORY  Past Medical History:   Diagnosis Date    Arthritis     Chronic lower back pain     Diverticulosis     Eczema     Environmental allergies     GERD (gastroesophageal reflux disease)     Ill-defined condition     glaucoma    Osteoarthritis, generalized     Rosacea     Spinal stenosis     Stroke ([facility] [address]     left eye blockage, loss of 1/3 sight in left eye      ____________________________________________  PAST SURGICAL HISTORY  Past Surgical History:   Procedure Laterality Date    APPENDECTOMY      CATARACT REMOVAL WITH IMPLANT Bilateral     COLECTOMY      COLON SURGERY      removed adhesions    COLONOSCOPY      KYPHOSIS SURGERY  2016    ORTHOPEDIC SURGERY Left     Hammer toe surgery.     TONSILLECTOMY      TOTAL HIP ARTHROPLASTY Right 2019    TUBAL LIGATION  circa     after 2 live births    WISDOM TOOTH EXTRACTION        ____________________________________________  HOME MEDICATIONS  Current

## 2023-06-28 PROBLEM — Z01.818 ENCOUNTER FOR PREADMISSION TESTING: Status: ACTIVE | Noted: 2023-06-28

## 2023-06-29 ENCOUNTER — HOSPITAL ENCOUNTER (OUTPATIENT)
Facility: HOSPITAL | Age: 83
Discharge: HOME | End: 2023-06-30
Attending: ORTHOPAEDIC SURGERY | Admitting: ORTHOPAEDIC SURGERY
Payer: MEDICARE

## 2023-06-29 ENCOUNTER — ANESTHESIA EVENT (OUTPATIENT)
Facility: HOSPITAL | Age: 83
End: 2023-06-29
Payer: MEDICARE

## 2023-06-29 ENCOUNTER — ANESTHESIA (OUTPATIENT)
Facility: HOSPITAL | Age: 83
End: 2023-06-29
Payer: MEDICARE

## 2023-06-29 DIAGNOSIS — Z98.890 STATUS POST KNEE SURGERY: Primary | ICD-10-CM

## 2023-06-29 PROBLEM — M17.11 PRIMARY LOCALIZED OSTEOARTHRITIS OF RIGHT KNEE: Status: ACTIVE | Noted: 2023-06-29

## 2023-06-29 LAB
GLUCOSE BLD STRIP.AUTO-MCNC: 94 MG/DL (ref 65–117)
SERVICE CMNT-IMP: NORMAL

## 2023-06-29 PROCEDURE — 3600000015 HC SURGERY LEVEL 5 ADDTL 15MIN: Performed by: ORTHOPAEDIC SURGERY

## 2023-06-29 PROCEDURE — 82962 GLUCOSE BLOOD TEST: CPT

## 2023-06-29 PROCEDURE — 2580000003 HC RX 258: Performed by: PHYSICIAN ASSISTANT

## 2023-06-29 PROCEDURE — 6370000000 HC RX 637 (ALT 250 FOR IP): Performed by: PHYSICIAN ASSISTANT

## 2023-06-29 PROCEDURE — C1713 ANCHOR/SCREW BN/BN,TIS/BN: HCPCS | Performed by: ORTHOPAEDIC SURGERY

## 2023-06-29 PROCEDURE — 6360000002 HC RX W HCPCS: Performed by: ANESTHESIOLOGY

## 2023-06-29 PROCEDURE — 2580000003 HC RX 258: Performed by: ANESTHESIOLOGY

## 2023-06-29 PROCEDURE — 97530 THERAPEUTIC ACTIVITIES: CPT

## 2023-06-29 PROCEDURE — 6360000002 HC RX W HCPCS: Performed by: ORTHOPAEDIC SURGERY

## 2023-06-29 PROCEDURE — 2580000003 HC RX 258: Performed by: ORTHOPAEDIC SURGERY

## 2023-06-29 PROCEDURE — 2720000010 HC SURG SUPPLY STERILE: Performed by: ORTHOPAEDIC SURGERY

## 2023-06-29 PROCEDURE — 3700000000 HC ANESTHESIA ATTENDED CARE: Performed by: ORTHOPAEDIC SURGERY

## 2023-06-29 PROCEDURE — 7100000001 HC PACU RECOVERY - ADDTL 15 MIN: Performed by: ORTHOPAEDIC SURGERY

## 2023-06-29 PROCEDURE — 64447 NJX AA&/STRD FEMORAL NRV IMG: CPT | Performed by: STUDENT IN AN ORGANIZED HEALTH CARE EDUCATION/TRAINING PROGRAM

## 2023-06-29 PROCEDURE — 7100000000 HC PACU RECOVERY - FIRST 15 MIN: Performed by: ORTHOPAEDIC SURGERY

## 2023-06-29 PROCEDURE — 6360000002 HC RX W HCPCS: Performed by: PHYSICIAN ASSISTANT

## 2023-06-29 PROCEDURE — C1776 JOINT DEVICE (IMPLANTABLE): HCPCS | Performed by: ORTHOPAEDIC SURGERY

## 2023-06-29 PROCEDURE — 6360000002 HC RX W HCPCS: Performed by: STUDENT IN AN ORGANIZED HEALTH CARE EDUCATION/TRAINING PROGRAM

## 2023-06-29 PROCEDURE — 97161 PT EVAL LOW COMPLEX 20 MIN: CPT

## 2023-06-29 PROCEDURE — 3600000005 HC SURGERY LEVEL 5 BASE: Performed by: ORTHOPAEDIC SURGERY

## 2023-06-29 PROCEDURE — 3700000001 HC ADD 15 MINUTES (ANESTHESIA): Performed by: ORTHOPAEDIC SURGERY

## 2023-06-29 PROCEDURE — C9290 INJ, BUPIVACAINE LIPOSOME: HCPCS | Performed by: ORTHOPAEDIC SURGERY

## 2023-06-29 PROCEDURE — 2500000003 HC RX 250 WO HCPCS: Performed by: ORTHOPAEDIC SURGERY

## 2023-06-29 PROCEDURE — 97116 GAIT TRAINING THERAPY: CPT

## 2023-06-29 PROCEDURE — 2709999900 HC NON-CHARGEABLE SUPPLY: Performed by: ORTHOPAEDIC SURGERY

## 2023-06-29 DEVICE — ATTUNE KNEE SYSTEM FEMORAL POSTERIOR STABILIZED SIZE 5 RIGHT CEMENTED
Type: IMPLANTABLE DEVICE | Site: KNEE | Status: FUNCTIONAL
Brand: ATTUNE

## 2023-06-29 DEVICE — SMARTSET GHV GENTAMICIN HIGH VISCOSITY BONE CEMENT 40G
Type: IMPLANTABLE DEVICE | Site: KNEE | Status: FUNCTIONAL
Brand: SMARTSET

## 2023-06-29 DEVICE — ATTUNE PATELLA MEDIALIZED DOME 38MM CEMENTED AOX
Type: IMPLANTABLE DEVICE | Site: KNEE | Status: FUNCTIONAL
Brand: ATTUNE

## 2023-06-29 DEVICE — KNEE K1 TOT HEMI STD CEM IMPL CAPPED SYNTHES: Type: IMPLANTABLE DEVICE | Status: FUNCTIONAL

## 2023-06-29 DEVICE — ATTUNE KNEE SYSTEM TIBIAL BASE FIXED BEARING SIZE 5 CEMENTED
Type: IMPLANTABLE DEVICE | Site: KNEE | Status: FUNCTIONAL
Brand: ATTUNE

## 2023-06-29 DEVICE — ATTUNE KNEE SYSTEM TIBIAL INSERT FIXED BEARING POSTERIOR STABILIZED 5 5MM AOX
Type: IMPLANTABLE DEVICE | Site: KNEE | Status: FUNCTIONAL
Brand: ATTUNE

## 2023-06-29 RX ORDER — FENTANYL CITRATE 50 UG/ML
25 INJECTION, SOLUTION INTRAMUSCULAR; INTRAVENOUS EVERY 5 MIN PRN
Status: DISCONTINUED | OUTPATIENT
Start: 2023-06-29 | End: 2023-06-29 | Stop reason: HOSPADM

## 2023-06-29 RX ORDER — HYDROMORPHONE HYDROCHLORIDE 1 MG/ML
0.25 INJECTION, SOLUTION INTRAMUSCULAR; INTRAVENOUS; SUBCUTANEOUS
Status: DISCONTINUED | OUTPATIENT
Start: 2023-06-29 | End: 2023-06-30 | Stop reason: HOSPADM

## 2023-06-29 RX ORDER — MELOXICAM 7.5 MG/1
3.75 TABLET ORAL ONCE
Status: DISCONTINUED | OUTPATIENT
Start: 2023-06-29 | End: 2023-06-29 | Stop reason: HOSPADM

## 2023-06-29 RX ORDER — SODIUM CHLORIDE 9 MG/ML
INJECTION, SOLUTION INTRAVENOUS PRN
Status: DISCONTINUED | OUTPATIENT
Start: 2023-06-29 | End: 2023-06-29 | Stop reason: SDUPTHER

## 2023-06-29 RX ORDER — DEXAMETHASONE SODIUM PHOSPHATE 4 MG/ML
INJECTION, SOLUTION INTRA-ARTICULAR; INTRALESIONAL; INTRAMUSCULAR; INTRAVENOUS; SOFT TISSUE PRN
Status: DISCONTINUED | OUTPATIENT
Start: 2023-06-29 | End: 2023-06-29 | Stop reason: SDUPTHER

## 2023-06-29 RX ORDER — DEXAMETHASONE SODIUM PHOSPHATE 4 MG/ML
8 INJECTION, SOLUTION INTRA-ARTICULAR; INTRALESIONAL; INTRAMUSCULAR; INTRAVENOUS; SOFT TISSUE ONCE
Status: DISCONTINUED | OUTPATIENT
Start: 2023-06-29 | End: 2023-06-29 | Stop reason: HOSPADM

## 2023-06-29 RX ORDER — POLYETHYLENE GLYCOL 3350 17 G/17G
17 POWDER, FOR SOLUTION ORAL DAILY
Status: DISCONTINUED | OUTPATIENT
Start: 2023-06-29 | End: 2023-06-30 | Stop reason: HOSPADM

## 2023-06-29 RX ORDER — SODIUM CHLORIDE 0.9 % (FLUSH) 0.9 %
5-40 SYRINGE (ML) INJECTION EVERY 12 HOURS SCHEDULED
Status: DISCONTINUED | OUTPATIENT
Start: 2023-06-29 | End: 2023-06-29 | Stop reason: HOSPADM

## 2023-06-29 RX ORDER — IPRATROPIUM BROMIDE 21 UG/1
1 SPRAY, METERED NASAL 2 TIMES DAILY
Status: DISCONTINUED | OUTPATIENT
Start: 2023-06-30 | End: 2023-06-30 | Stop reason: HOSPADM

## 2023-06-29 RX ORDER — SODIUM CHLORIDE 0.9 % (FLUSH) 0.9 %
5-40 SYRINGE (ML) INJECTION PRN
Status: DISCONTINUED | OUTPATIENT
Start: 2023-06-29 | End: 2023-06-29 | Stop reason: HOSPADM

## 2023-06-29 RX ORDER — ACETAMINOPHEN 500 MG
1000 TABLET ORAL ONCE
Status: DISCONTINUED | OUTPATIENT
Start: 2023-06-29 | End: 2023-06-29 | Stop reason: HOSPADM

## 2023-06-29 RX ORDER — SODIUM CHLORIDE 9 MG/ML
INJECTION, SOLUTION INTRAVENOUS PRN
Status: DISCONTINUED | OUTPATIENT
Start: 2023-06-29 | End: 2023-06-30 | Stop reason: HOSPADM

## 2023-06-29 RX ORDER — PROCHLORPERAZINE EDISYLATE 5 MG/ML
5 INJECTION INTRAMUSCULAR; INTRAVENOUS
Status: DISCONTINUED | OUTPATIENT
Start: 2023-06-29 | End: 2023-06-29 | Stop reason: HOSPADM

## 2023-06-29 RX ORDER — SODIUM CHLORIDE 9 MG/ML
INJECTION, SOLUTION INTRAVENOUS CONTINUOUS
Status: DISCONTINUED | OUTPATIENT
Start: 2023-06-29 | End: 2023-06-30 | Stop reason: HOSPADM

## 2023-06-29 RX ORDER — SODIUM CHLORIDE 9 MG/ML
INJECTION, SOLUTION INTRAVENOUS PRN
Status: DISCONTINUED | OUTPATIENT
Start: 2023-06-29 | End: 2023-06-29 | Stop reason: HOSPADM

## 2023-06-29 RX ORDER — CETIRIZINE HYDROCHLORIDE 10 MG/1
10 TABLET ORAL DAILY
Status: DISCONTINUED | OUTPATIENT
Start: 2023-06-30 | End: 2023-06-30 | Stop reason: HOSPADM

## 2023-06-29 RX ORDER — TRANEXAMIC ACID 100 MG/ML
INJECTION, SOLUTION INTRAVENOUS PRN
Status: DISCONTINUED | OUTPATIENT
Start: 2023-06-29 | End: 2023-06-29 | Stop reason: HOSPADM

## 2023-06-29 RX ORDER — OXYCODONE HYDROCHLORIDE 5 MG/1
10 TABLET ORAL EVERY 4 HOURS PRN
Status: DISCONTINUED | OUTPATIENT
Start: 2023-06-29 | End: 2023-06-30 | Stop reason: HOSPADM

## 2023-06-29 RX ORDER — ASPIRIN 325 MG
325 TABLET, DELAYED RELEASE (ENTERIC COATED) ORAL ONCE
Status: COMPLETED | OUTPATIENT
Start: 2023-06-29 | End: 2023-06-29

## 2023-06-29 RX ORDER — SODIUM CHLORIDE, SODIUM LACTATE, POTASSIUM CHLORIDE, CALCIUM CHLORIDE 600; 310; 30; 20 MG/100ML; MG/100ML; MG/100ML; MG/100ML
INJECTION, SOLUTION INTRAVENOUS CONTINUOUS
Status: DISCONTINUED | OUTPATIENT
Start: 2023-06-29 | End: 2023-06-29 | Stop reason: HOSPADM

## 2023-06-29 RX ORDER — OXYCODONE HYDROCHLORIDE 5 MG/1
5 TABLET ORAL EVERY 4 HOURS PRN
Status: DISCONTINUED | OUTPATIENT
Start: 2023-06-29 | End: 2023-06-30 | Stop reason: HOSPADM

## 2023-06-29 RX ORDER — ACETAMINOPHEN 325 MG/1
650 TABLET ORAL EVERY 6 HOURS
Status: DISCONTINUED | OUTPATIENT
Start: 2023-06-29 | End: 2023-06-30 | Stop reason: HOSPADM

## 2023-06-29 RX ORDER — MIDAZOLAM HYDROCHLORIDE 2 MG/2ML
2 INJECTION, SOLUTION INTRAMUSCULAR; INTRAVENOUS
Status: COMPLETED | OUTPATIENT
Start: 2023-06-29 | End: 2023-06-29

## 2023-06-29 RX ORDER — SODIUM CHLORIDE 0.9 % (FLUSH) 0.9 %
5-40 SYRINGE (ML) INJECTION PRN
Status: DISCONTINUED | OUTPATIENT
Start: 2023-06-29 | End: 2023-06-30 | Stop reason: HOSPADM

## 2023-06-29 RX ORDER — BISACODYL 10 MG
10 SUPPOSITORY, RECTAL RECTAL DAILY PRN
Status: DISCONTINUED | OUTPATIENT
Start: 2023-06-29 | End: 2023-06-30 | Stop reason: HOSPADM

## 2023-06-29 RX ORDER — ONDANSETRON 2 MG/ML
4 INJECTION INTRAMUSCULAR; INTRAVENOUS
Status: DISCONTINUED | OUTPATIENT
Start: 2023-06-29 | End: 2023-06-29 | Stop reason: HOSPADM

## 2023-06-29 RX ORDER — FENTANYL CITRATE 50 UG/ML
100 INJECTION, SOLUTION INTRAMUSCULAR; INTRAVENOUS
Status: COMPLETED | OUTPATIENT
Start: 2023-06-29 | End: 2023-06-29

## 2023-06-29 RX ORDER — ACETAMINOPHEN 500 MG
1000 TABLET ORAL ONCE
Status: DISCONTINUED | OUTPATIENT
Start: 2023-06-29 | End: 2023-06-29 | Stop reason: SDUPTHER

## 2023-06-29 RX ORDER — FAMOTIDINE 20 MG/1
20 TABLET, FILM COATED ORAL 2 TIMES DAILY
Status: DISCONTINUED | OUTPATIENT
Start: 2023-06-29 | End: 2023-06-30 | Stop reason: HOSPADM

## 2023-06-29 RX ORDER — ROPIVACAINE HYDROCHLORIDE 5 MG/ML
INJECTION, SOLUTION EPIDURAL; INFILTRATION; PERINEURAL
Status: COMPLETED | OUTPATIENT
Start: 2023-06-29 | End: 2023-06-29

## 2023-06-29 RX ORDER — LIDOCAINE HYDROCHLORIDE 10 MG/ML
1 INJECTION, SOLUTION EPIDURAL; INFILTRATION; INTRACAUDAL; PERINEURAL
Status: DISCONTINUED | OUTPATIENT
Start: 2023-06-29 | End: 2023-06-29 | Stop reason: HOSPADM

## 2023-06-29 RX ORDER — HYDROMORPHONE HYDROCHLORIDE 1 MG/ML
0.5 INJECTION, SOLUTION INTRAMUSCULAR; INTRAVENOUS; SUBCUTANEOUS
Status: DISCONTINUED | OUTPATIENT
Start: 2023-06-29 | End: 2023-06-30 | Stop reason: HOSPADM

## 2023-06-29 RX ORDER — ONDANSETRON 2 MG/ML
INJECTION INTRAMUSCULAR; INTRAVENOUS PRN
Status: DISCONTINUED | OUTPATIENT
Start: 2023-06-29 | End: 2023-06-29 | Stop reason: SDUPTHER

## 2023-06-29 RX ORDER — MELOXICAM 7.5 MG/1
3.75 TABLET ORAL DAILY
Status: DISCONTINUED | OUTPATIENT
Start: 2023-06-29 | End: 2023-06-30 | Stop reason: HOSPADM

## 2023-06-29 RX ORDER — HYDRALAZINE HYDROCHLORIDE 20 MG/ML
10 INJECTION INTRAMUSCULAR; INTRAVENOUS
Status: DISCONTINUED | OUTPATIENT
Start: 2023-06-29 | End: 2023-06-29 | Stop reason: HOSPADM

## 2023-06-29 RX ORDER — DIPHENHYDRAMINE HYDROCHLORIDE 50 MG/ML
25 INJECTION INTRAMUSCULAR; INTRAVENOUS EVERY 6 HOURS PRN
Status: DISCONTINUED | OUTPATIENT
Start: 2023-06-29 | End: 2023-06-30 | Stop reason: HOSPADM

## 2023-06-29 RX ORDER — OXYCODONE HYDROCHLORIDE 5 MG/1
5 TABLET ORAL
Status: DISCONTINUED | OUTPATIENT
Start: 2023-06-29 | End: 2023-06-29 | Stop reason: HOSPADM

## 2023-06-29 RX ORDER — ONDANSETRON 2 MG/ML
4 INJECTION INTRAMUSCULAR; INTRAVENOUS EVERY 6 HOURS PRN
Status: DISCONTINUED | OUTPATIENT
Start: 2023-06-29 | End: 2023-06-30 | Stop reason: HOSPADM

## 2023-06-29 RX ORDER — SODIUM CHLORIDE 0.9 % (FLUSH) 0.9 %
5-40 SYRINGE (ML) INJECTION EVERY 12 HOURS SCHEDULED
Status: DISCONTINUED | OUTPATIENT
Start: 2023-06-29 | End: 2023-06-30 | Stop reason: HOSPADM

## 2023-06-29 RX ORDER — ONDANSETRON 4 MG/1
4 TABLET, ORALLY DISINTEGRATING ORAL EVERY 8 HOURS PRN
Status: DISCONTINUED | OUTPATIENT
Start: 2023-06-29 | End: 2023-06-30 | Stop reason: HOSPADM

## 2023-06-29 RX ORDER — LATANOPROST 50 UG/ML
1 SOLUTION/ DROPS OPHTHALMIC NIGHTLY
Status: DISCONTINUED | OUTPATIENT
Start: 2023-06-29 | End: 2023-06-30 | Stop reason: HOSPADM

## 2023-06-29 RX ORDER — ONDANSETRON 2 MG/ML
4 INJECTION INTRAMUSCULAR; INTRAVENOUS AS NEEDED
Status: DISCONTINUED | OUTPATIENT
Start: 2023-06-29 | End: 2023-06-29 | Stop reason: HOSPADM

## 2023-06-29 RX ORDER — DIPHENHYDRAMINE HCL 25 MG
25 CAPSULE ORAL EVERY 6 HOURS PRN
Status: DISCONTINUED | OUTPATIENT
Start: 2023-06-29 | End: 2023-06-30 | Stop reason: HOSPADM

## 2023-06-29 RX ORDER — BISACODYL 5 MG/1
5 TABLET, DELAYED RELEASE ORAL DAILY PRN
Status: DISCONTINUED | OUTPATIENT
Start: 2023-06-29 | End: 2023-06-30 | Stop reason: HOSPADM

## 2023-06-29 RX ORDER — HYDROMORPHONE HYDROCHLORIDE 1 MG/ML
0.5 INJECTION, SOLUTION INTRAMUSCULAR; INTRAVENOUS; SUBCUTANEOUS EVERY 5 MIN PRN
Status: DISCONTINUED | OUTPATIENT
Start: 2023-06-29 | End: 2023-06-29 | Stop reason: HOSPADM

## 2023-06-29 RX ADMIN — OXYCODONE HYDROCHLORIDE 5 MG: 5 TABLET ORAL at 21:46

## 2023-06-29 RX ADMIN — ROPIVACAINE HYDROCHLORIDE 30 ML: 5 INJECTION, SOLUTION EPIDURAL; INFILTRATION; PERINEURAL at 07:12

## 2023-06-29 RX ADMIN — SODIUM CHLORIDE, PRESERVATIVE FREE 10 ML: 5 INJECTION INTRAVENOUS at 11:51

## 2023-06-29 RX ADMIN — ACETAMINOPHEN 650 MG: 325 TABLET ORAL at 12:04

## 2023-06-29 RX ADMIN — POLYETHYLENE GLYCOL 3350 17 G: 17 POWDER, FOR SOLUTION ORAL at 12:05

## 2023-06-29 RX ADMIN — OXYCODONE HYDROCHLORIDE 10 MG: 5 TABLET ORAL at 12:05

## 2023-06-29 RX ADMIN — SODIUM CHLORIDE: 9 INJECTION, SOLUTION INTRAVENOUS at 10:55

## 2023-06-29 RX ADMIN — PROPOFOL 25 MG: 10 INJECTION, EMULSION INTRAVENOUS at 07:52

## 2023-06-29 RX ADMIN — FAMOTIDINE 20 MG: 20 TABLET ORAL at 21:43

## 2023-06-29 RX ADMIN — PROPOFOL 25 MG: 10 INJECTION, EMULSION INTRAVENOUS at 08:56

## 2023-06-29 RX ADMIN — SODIUM CHLORIDE, PRESERVATIVE FREE 10 ML: 5 INJECTION INTRAVENOUS at 21:44

## 2023-06-29 RX ADMIN — PROPOFOL 20 MG: 10 INJECTION, EMULSION INTRAVENOUS at 07:43

## 2023-06-29 RX ADMIN — OXYCODONE HYDROCHLORIDE 10 MG: 5 TABLET ORAL at 17:11

## 2023-06-29 RX ADMIN — ACETAMINOPHEN 650 MG: 325 TABLET ORAL at 18:01

## 2023-06-29 RX ADMIN — DEXAMETHASONE SODIUM PHOSPHATE 4 MG: 4 INJECTION, SOLUTION INTRAMUSCULAR; INTRAVENOUS at 08:28

## 2023-06-29 RX ADMIN — WATER 2000 MG: 1 INJECTION INTRAMUSCULAR; INTRAVENOUS; SUBCUTANEOUS at 16:58

## 2023-06-29 RX ADMIN — ONDANSETRON HYDROCHLORIDE 4 MG: 2 INJECTION, SOLUTION INTRAMUSCULAR; INTRAVENOUS at 09:18

## 2023-06-29 RX ADMIN — PROPOFOL 30 MG: 10 INJECTION, EMULSION INTRAVENOUS at 07:42

## 2023-06-29 RX ADMIN — ASPIRIN 325 MG: 325 TABLET, COATED ORAL at 21:44

## 2023-06-29 RX ADMIN — PHENYLEPHRINE HYDROCHLORIDE 40 MCG/MIN: 10 INJECTION INTRAVENOUS at 07:50

## 2023-06-29 RX ADMIN — SODIUM CHLORIDE, POTASSIUM CHLORIDE, SODIUM LACTATE AND CALCIUM CHLORIDE: 600; 310; 30; 20 INJECTION, SOLUTION INTRAVENOUS at 07:00

## 2023-06-29 RX ADMIN — FENTANYL CITRATE 25 MCG: 50 INJECTION, SOLUTION INTRAMUSCULAR; INTRAVENOUS at 07:15

## 2023-06-29 RX ADMIN — MIDAZOLAM 1 MG: 1 INJECTION INTRAMUSCULAR; INTRAVENOUS at 07:17

## 2023-06-29 RX ADMIN — APIXABAN 2.5 MG: 2.5 TABLET, FILM COATED ORAL at 21:43

## 2023-06-29 RX ADMIN — MEPIVACAINE HYDROCHLORIDE 45 MG: 15 INJECTION, SOLUTION EPIDURAL; INFILTRATION at 07:45

## 2023-06-29 RX ADMIN — WATER 2000 MG: 1 INJECTION INTRAMUSCULAR; INTRAVENOUS; SUBCUTANEOUS at 08:10

## 2023-06-29 RX ADMIN — PROPOFOL 25 MG: 10 INJECTION, EMULSION INTRAVENOUS at 08:12

## 2023-06-29 ASSESSMENT — PAIN DESCRIPTION - ORIENTATION
ORIENTATION: LEFT
ORIENTATION: RIGHT
ORIENTATION: LEFT
ORIENTATION: RIGHT

## 2023-06-29 ASSESSMENT — PAIN DESCRIPTION - LOCATION
LOCATION: KNEE

## 2023-06-29 ASSESSMENT — PAIN SCALES - GENERAL
PAINLEVEL_OUTOF10: 0
PAINLEVEL_OUTOF10: 7
PAINLEVEL_OUTOF10: 8
PAINLEVEL_OUTOF10: 0
PAINLEVEL_OUTOF10: 2
PAINLEVEL_OUTOF10: 5
PAINLEVEL_OUTOF10: 0

## 2023-06-29 ASSESSMENT — PAIN DESCRIPTION - DESCRIPTORS
DESCRIPTORS: ACHING;BURNING;CRAMPING
DESCRIPTORS: ACHING
DESCRIPTORS: ACHING;BURNING

## 2023-06-29 ASSESSMENT — PAIN - FUNCTIONAL ASSESSMENT
PAIN_FUNCTIONAL_ASSESSMENT: 0-10
PAIN_FUNCTIONAL_ASSESSMENT: ACTIVITIES ARE NOT PREVENTED

## 2023-06-29 ASSESSMENT — PAIN DESCRIPTION - PAIN TYPE
TYPE: SURGICAL PAIN

## 2023-06-30 VITALS
SYSTOLIC BLOOD PRESSURE: 119 MMHG | DIASTOLIC BLOOD PRESSURE: 67 MMHG | WEIGHT: 133 LBS | TEMPERATURE: 97.6 F | OXYGEN SATURATION: 99 % | HEIGHT: 70 IN | BODY MASS INDEX: 19.04 KG/M2 | HEART RATE: 77 BPM | RESPIRATION RATE: 18 BRPM

## 2023-06-30 LAB
ALBUMIN SERPL-MCNC: 2.8 G/DL (ref 3.5–5)
ALBUMIN/GLOB SERPL: 1.2 (ref 1.1–2.2)
ALP SERPL-CCNC: 41 U/L (ref 45–117)
ALT SERPL-CCNC: 16 U/L (ref 12–78)
ANION GAP SERPL CALC-SCNC: 4 MMOL/L (ref 5–15)
AST SERPL-CCNC: 11 U/L (ref 15–37)
BASOPHILS # BLD: 0 K/UL (ref 0–0.1)
BASOPHILS NFR BLD: 0 % (ref 0–1)
BILIRUB SERPL-MCNC: 0.4 MG/DL (ref 0.2–1)
BUN SERPL-MCNC: 15 MG/DL (ref 6–20)
BUN/CREAT SERPL: 24 (ref 12–20)
CALCIUM SERPL-MCNC: 7.5 MG/DL (ref 8.5–10.1)
CHLORIDE SERPL-SCNC: 111 MMOL/L (ref 97–108)
CO2 SERPL-SCNC: 26 MMOL/L (ref 21–32)
CREAT SERPL-MCNC: 0.62 MG/DL (ref 0.55–1.02)
DIFFERENTIAL METHOD BLD: ABNORMAL
EOSINOPHIL # BLD: 0.1 K/UL (ref 0–0.4)
EOSINOPHIL NFR BLD: 1 % (ref 0–7)
ERYTHROCYTE [DISTWIDTH] IN BLOOD BY AUTOMATED COUNT: 13.3 % (ref 11.5–14.5)
GLOBULIN SER CALC-MCNC: 2.3 G/DL (ref 2–4)
GLUCOSE SERPL-MCNC: 98 MG/DL (ref 65–100)
HCT VFR BLD AUTO: 30.9 % (ref 35–47)
HGB BLD-MCNC: 9.5 G/DL (ref 11.5–16)
IMM GRANULOCYTES # BLD AUTO: 0 K/UL (ref 0–0.04)
IMM GRANULOCYTES NFR BLD AUTO: 0 % (ref 0–0.5)
LYMPHOCYTES # BLD: 0.9 K/UL (ref 0.8–3.5)
LYMPHOCYTES NFR BLD: 10 % (ref 12–49)
MCH RBC QN AUTO: 29 PG (ref 26–34)
MCHC RBC AUTO-ENTMCNC: 30.7 G/DL (ref 30–36.5)
MCV RBC AUTO: 94.2 FL (ref 80–99)
MONOCYTES # BLD: 1.2 K/UL (ref 0–1)
MONOCYTES NFR BLD: 14 % (ref 5–13)
NEUTS SEG # BLD: 6.1 K/UL (ref 1.8–8)
NEUTS SEG NFR BLD: 74 % (ref 32–75)
NRBC # BLD: 0 K/UL (ref 0–0.01)
NRBC BLD-RTO: 0 PER 100 WBC
PLATELET # BLD AUTO: 153 K/UL (ref 150–400)
PMV BLD AUTO: 11.1 FL (ref 8.9–12.9)
POTASSIUM SERPL-SCNC: 3.7 MMOL/L (ref 3.5–5.1)
PROT SERPL-MCNC: 5.1 G/DL (ref 6.4–8.2)
RBC # BLD AUTO: 3.28 M/UL (ref 3.8–5.2)
SODIUM SERPL-SCNC: 141 MMOL/L (ref 136–145)
WBC # BLD AUTO: 8.3 K/UL (ref 3.6–11)

## 2023-06-30 PROCEDURE — 6360000002 HC RX W HCPCS: Performed by: PHYSICIAN ASSISTANT

## 2023-06-30 PROCEDURE — 97530 THERAPEUTIC ACTIVITIES: CPT

## 2023-06-30 PROCEDURE — 80053 COMPREHEN METABOLIC PANEL: CPT

## 2023-06-30 PROCEDURE — 85025 COMPLETE CBC W/AUTO DIFF WBC: CPT

## 2023-06-30 PROCEDURE — 2580000003 HC RX 258: Performed by: PHYSICIAN ASSISTANT

## 2023-06-30 PROCEDURE — 6370000000 HC RX 637 (ALT 250 FOR IP): Performed by: PHYSICIAN ASSISTANT

## 2023-06-30 PROCEDURE — 97116 GAIT TRAINING THERAPY: CPT

## 2023-06-30 PROCEDURE — 36415 COLL VENOUS BLD VENIPUNCTURE: CPT

## 2023-06-30 RX ORDER — OXYCODONE HYDROCHLORIDE 5 MG/1
5-10 TABLET ORAL EVERY 4 HOURS PRN
Qty: 42 TABLET | Refills: 0 | Status: SHIPPED | OUTPATIENT
Start: 2023-06-30 | End: 2023-07-07

## 2023-06-30 RX ORDER — ASPIRIN 325 MG
325 TABLET, DELAYED RELEASE (ENTERIC COATED) ORAL
Status: DISCONTINUED | OUTPATIENT
Start: 2023-06-30 | End: 2023-06-30 | Stop reason: HOSPADM

## 2023-06-30 RX ORDER — SENNA AND DOCUSATE SODIUM 50; 8.6 MG/1; MG/1
1 TABLET, FILM COATED ORAL DAILY PRN
Qty: 5 TABLET | Refills: 0 | Status: SHIPPED | OUTPATIENT
Start: 2023-06-30 | End: 2023-07-05

## 2023-06-30 RX ADMIN — ACETAMINOPHEN 650 MG: 325 TABLET ORAL at 00:21

## 2023-06-30 RX ADMIN — FAMOTIDINE 20 MG: 20 TABLET ORAL at 09:30

## 2023-06-30 RX ADMIN — WATER 2000 MG: 1 INJECTION INTRAMUSCULAR; INTRAVENOUS; SUBCUTANEOUS at 00:21

## 2023-06-30 RX ADMIN — OXYCODONE HYDROCHLORIDE 10 MG: 5 TABLET ORAL at 07:30

## 2023-06-30 RX ADMIN — ACETAMINOPHEN 650 MG: 325 TABLET ORAL at 06:36

## 2023-06-30 RX ADMIN — OXYCODONE HYDROCHLORIDE 10 MG: 5 TABLET ORAL at 03:11

## 2023-06-30 RX ADMIN — OXYCODONE HYDROCHLORIDE 5 MG: 5 TABLET ORAL at 12:15

## 2023-06-30 ASSESSMENT — PAIN SCALES - GENERAL
PAINLEVEL_OUTOF10: 7
PAINLEVEL_OUTOF10: 4
PAINLEVEL_OUTOF10: 7
PAINLEVEL_OUTOF10: 7
PAINLEVEL_OUTOF10: 4
PAINLEVEL_OUTOF10: 7

## 2023-06-30 ASSESSMENT — PAIN DESCRIPTION - ORIENTATION
ORIENTATION: RIGHT

## 2023-06-30 ASSESSMENT — PAIN DESCRIPTION - LOCATION
LOCATION: KNEE
LOCATION: KNEE
LOCATION: LEG
LOCATION: LEG

## 2023-06-30 ASSESSMENT — PAIN - FUNCTIONAL ASSESSMENT
PAIN_FUNCTIONAL_ASSESSMENT: PREVENTS OR INTERFERES SOME ACTIVE ACTIVITIES AND ADLS
PAIN_FUNCTIONAL_ASSESSMENT: PREVENTS OR INTERFERES SOME ACTIVE ACTIVITIES AND ADLS

## 2023-06-30 ASSESSMENT — PAIN DESCRIPTION - DESCRIPTORS
DESCRIPTORS: ACHING

## 2023-06-30 ASSESSMENT — PAIN DESCRIPTION - PAIN TYPE
TYPE: SURGICAL PAIN

## 2023-07-05 NOTE — NURSE NAVIGATOR
Post Discharge Follow-up contact after Joint Replacement    Patient discharged on 6/30/23  By      following  Right total knee Arthroplasty. Spoke with patient today, who reports that \" She is better now but had a frustrating weekend. Home Health did not get in touch with her till Sunday evening and she was very concerned. .\"  Denies Fever, Shortness of Breath or Chest Pain. Home Health has visited. Patient also reports:  Surgical dressing clean, dry and intact  Calf is non-tender, operative extremity has moderate swelling. Pain is well managed. Discussed use of ice & elevation. Patient is progressing with therapy and is exercising independently. Taking Aspirin for anticoagulation, Tylenol, oxycodone for pain. Patient is not experiencing symptoms of constipation & urinating without difficulty. Discussed side effects of anticoagulants & pain medications (bleeding/bruising, constipation, lightheaded/dizziness)  Follow up appointment is scheduled; but patient states plan to schedule. Discussed calling surgeon Dr. Dylon Gamez  for drainage, bleeding, swelling in operative extremity, fever or pain.

## (undated) DEVICE — CONTAINER,SPECIMEN,3OZ,OR STRL: Brand: MEDLINE

## (undated) DEVICE — GAUZE,SPONGE,2"X2",8PLY,STERILE,LF,2'S: Brand: MEDLINE

## (undated) DEVICE — SOLUTION IRRIG 3000ML 0.9% SOD CHL USP UROMATIC PLAS CONT

## (undated) DEVICE — BANDAGE COMPR M W6INXL10YD WHT BGE VELC E MTRX HK AND LOOP

## (undated) DEVICE — BLADE ELECTRODE: Brand: EDGE

## (undated) DEVICE — Device

## (undated) DEVICE — APPLICATOR MEDICATED 26 CC SOLUTION HI LT ORNG CHLORAPREP

## (undated) DEVICE — GARMENT,MEDLINE,DVT,INT,CALF,MED, GEN2: Brand: MEDLINE

## (undated) DEVICE — PADDING CAST W6INXL4YD NONSTERILE COT RAYON MICROPLEATED

## (undated) DEVICE — SOLUTION IRRIG 1000ML H2O STRL BLT

## (undated) DEVICE — BLADE SAW W073XL276IN THK0031IN CUT THK0036IN REPL SAG

## (undated) DEVICE — GLOVE,SURG,SENSICARE,ALOE,LF,PF,7: Brand: MEDLINE

## (undated) DEVICE — STRYKER PERFORMANCE SERIES SAGITTAL BLADE: Brand: STRYKER PERFORMANCE SERIES

## (undated) DEVICE — SUTURE STRATAFIX SYMMETRIC PDS + SZ 1 L18IN ABSRB VLT L48MM SXPP1A400

## (undated) DEVICE — STERILE POLYISOPRENE POWDER-FREE SURGICAL GLOVES WITH EMOLLIENT COATING: Brand: PROTEXIS

## (undated) DEVICE — TUBING, SUCTION, 9/32" X 12', STRAIGHT: Brand: MEDLINE INDUSTRIES, INC.

## (undated) DEVICE — SLIM BODY SKIN STAPLER: Brand: APPOSE ULC

## (undated) DEVICE — SYR LR LCK 1ML GRAD NSAF 30ML --

## (undated) DEVICE — TOWEL SURG W17XL27IN STD BLU COT NONFENESTRATED PREWASHED

## (undated) DEVICE — STERILE POLYISOPRENE POWDER-FREE SURGICAL GLOVES: Brand: PROTEXIS

## (undated) DEVICE — SUTURE VCRL SZ 1 L36IN ABSRB UD L36MM CT-1 1/2 CIR J947H

## (undated) DEVICE — PATIENT PROTECTIVE PAD FOR IMP UNIVERSAL LATERAL HIP POSITIONER (ULP) (6/CASE): Brand: PATIENT PROTECTIVE PAD

## (undated) DEVICE — SUTURE VCRL SZ 2 L54IN ABSRB UD L65MM TP-1 1/2 CIR J880T

## (undated) DEVICE — STRAP POS KNEE BODY VELC

## (undated) DEVICE — COVER,MAYO STAND,STERILE: Brand: MEDLINE

## (undated) DEVICE — PAD 05IN BASE 3IN PEAK M DENS CONVOLUTED FOAM

## (undated) DEVICE — HYPODERMIC SAFETY NEEDLE: Brand: MAGELLAN

## (undated) DEVICE — ZIMMER® STERILE DISPOSABLE TOURNIQUET CUFF WITH PLC, DUAL PORT, SINGLE BLADDER, 34 IN. (86 CM)

## (undated) DEVICE — SUTURE VCRL SZ 2-0 L36IN ABSRB UD L40MM CT 1/2 CIR J957H

## (undated) DEVICE — TOTAL JOINT - SMH: Brand: MEDLINE INDUSTRIES, INC.

## (undated) DEVICE — HANDPIECE SET WITH BONE CLEANING TIP AND SUCTION TUBE: Brand: INTERPULSE

## (undated) DEVICE — KENDALL SCD EXPRESS SLEEVES, KNEE LENGTH, MEDIUM: Brand: KENDALL SCD

## (undated) DEVICE — TRAY CATH 16F URIN MTR LTX -- CONVERT TO ITEM 363111

## (undated) DEVICE — (D)PREP SKN CHLRAPRP APPL 26ML -- CONVERT TO ITEM 371833

## (undated) DEVICE — CARTRIDGE BNE CEM MIX UNIV TWR VAC ROTOR BRK OFF NOZ W/O

## (undated) DEVICE — ATTUNE SOLO PINNING SYSTEM

## (undated) DEVICE — REM POLYHESIVE ADULT PATIENT RETURN ELECTRODE: Brand: VALLEYLAB

## (undated) DEVICE — INFECTION CONTROL KIT SYS

## (undated) DEVICE — 3M™ IOBAN™ 2 ANTIMICROBIAL INCISE DRAPE 6651EZ: Brand: IOBAN™ 2

## (undated) DEVICE — BIOLOX DELTA CERAMIC FEMORAL HEAD +5.0 36MM DIA 12/14 TAPER
Type: IMPLANTABLE DEVICE | Site: HIP | Status: NON-FUNCTIONAL
Brand: BIOLOX DELTA
Removed: 2019-05-29

## (undated) DEVICE — T4 HOOD

## (undated) DEVICE — SOLUTION IRRIG 3000ML 0.9% SOD CHL FLX CONT 0797208] ICU MEDICAL INC]

## (undated) DEVICE — GLOVE SURG SZ 65 L12IN FNGR THK94MIL STD WHT LTX FREE

## (undated) DEVICE — 4-PORT MANIFOLD: Brand: NEPTUNE 2

## (undated) DEVICE — DRESSING HYDROFIBER AQUACEL AG ADVANTAGE 3.5X14 IN

## (undated) DEVICE — GLOVE SURG SZ 7 L12IN FNGR THK79MIL GRN LTX FREE

## (undated) DEVICE — HOOD, PEEL-AWAY: Brand: FLYTE

## (undated) DEVICE — DRAPE,EXTREMITY,89X128,STERILE: Brand: MEDLINE

## (undated) DEVICE — SCRUB DRY SURG EZ SCRUB BRUSH PREOPERATIVE GRN

## (undated) DEVICE — DRSG AQUACEL SURG 3.5 X 10IN -- CONVERT TO ITEM 370183

## (undated) DEVICE — DRAPE,REIN 53X77,STERILE: Brand: MEDLINE

## (undated) DEVICE — NEEDLE HYPO 22GA L1.5IN BLK S STL HUB POLYPR SHLD REG BVL

## (undated) DEVICE — SYRINGE 20ML LL S/C 50

## (undated) DEVICE — CUSTOM CAST PD STR

## (undated) DEVICE — 1LYRTR 16FR10ML100%SIL UMS SNP: Brand: MEDLINE INDUSTRIES, INC.

## (undated) DEVICE — ELECTRODE PT RET AD L9FT HI MOIST COND ADH HYDRGEL CORDED

## (undated) DEVICE — TAPE,CLOTH/SILK,CURAD,3"X10YD,LF,40/CS: Brand: CURAD